# Patient Record
Sex: FEMALE | Race: ASIAN | NOT HISPANIC OR LATINO | Employment: OTHER | URBAN - METROPOLITAN AREA
[De-identification: names, ages, dates, MRNs, and addresses within clinical notes are randomized per-mention and may not be internally consistent; named-entity substitution may affect disease eponyms.]

---

## 2019-11-15 ENCOUNTER — OFFICE VISIT (OUTPATIENT)
Dept: FAMILY MEDICINE | Facility: CLINIC | Age: 34
End: 2019-11-15
Payer: COMMERCIAL

## 2019-11-15 VITALS — DIASTOLIC BLOOD PRESSURE: 70 MMHG | SYSTOLIC BLOOD PRESSURE: 112 MMHG

## 2019-11-15 DIAGNOSIS — L28.0 LICHEN SIMPLEX CHRONICUS: ICD-10-CM

## 2019-11-15 DIAGNOSIS — L29.9 LOCALIZED PRURITUS: ICD-10-CM

## 2019-11-15 DIAGNOSIS — L20.9 ATOPIC DERMATITIS, UNSPECIFIED TYPE: Primary | ICD-10-CM

## 2019-11-15 DIAGNOSIS — L85.3 XEROSIS OF SKIN: ICD-10-CM

## 2019-11-15 PROCEDURE — 99203 OFFICE O/P NEW LOW 30 MIN: CPT | Performed by: PHYSICIAN ASSISTANT

## 2019-11-15 RX ORDER — TRIAMCINOLONE ACETONIDE 0.25 MG/G
CREAM TOPICAL
Qty: 15 G | Refills: 0 | Status: SHIPPED | OUTPATIENT
Start: 2019-11-15 | End: 2021-08-06

## 2019-11-15 RX ORDER — FLUOCINONIDE 0.5 MG/G
CREAM TOPICAL 2 TIMES DAILY
Qty: 30 G | Refills: 1 | Status: SHIPPED | OUTPATIENT
Start: 2019-11-15 | End: 2021-08-06

## 2019-11-15 RX ORDER — TRIAMCINOLONE ACETONIDE 0.25 MG/G
CREAM TOPICAL
Qty: 30 G | Refills: 0 | Status: SHIPPED | OUTPATIENT
Start: 2019-11-15 | End: 2019-11-15

## 2019-11-15 RX ORDER — TRIAMCINOLONE ACETONIDE 1 MG/G
CREAM TOPICAL 2 TIMES DAILY
Qty: 30 G | Refills: 1 | Status: SHIPPED | OUTPATIENT
Start: 2019-11-15 | End: 2021-08-06

## 2019-11-15 NOTE — PROGRESS NOTES
HPI:  Maryam Tomlin is a 34 year old female patient here today for rash on eyelids, antecubital fossa, right axilla, and medial thighs .  Patient states this has been present for a while.  Patient reports the following symptoms: itch and rash .  Patient reports the following previous treatments: otc antifungal with no change.  Recently moved to [place] about 7-8 months ago. States skin is very dry. Patient reports the following modifying factors: none.  Associated symptoms: none.  Patient has no other skin complaints today.  Remainder of the HPI, Meds, PMH, Allergies, FH, and SH was reviewed in chart.      History reviewed. No pertinent past medical history.    History reviewed. No pertinent surgical history.     History reviewed. No pertinent family history.    Social History     Socioeconomic History     Marital status:      Spouse name: Not on file     Number of children: Not on file     Years of education: Not on file     Highest education level: Not on file   Occupational History     Not on file   Social Needs     Financial resource strain: Not on file     Food insecurity:     Worry: Not on file     Inability: Not on file     Transportation needs:     Medical: Not on file     Non-medical: Not on file   Tobacco Use     Smoking status: Never Smoker     Smokeless tobacco: Never Used   Substance and Sexual Activity     Alcohol use: Not on file     Drug use: Not on file     Sexual activity: Not on file   Lifestyle     Physical activity:     Days per week: Not on file     Minutes per session: Not on file     Stress: Not on file   Relationships     Social connections:     Talks on phone: Not on file     Gets together: Not on file     Attends Sabianist service: Not on file     Active member of club or organization: Not on file     Attends meetings of clubs or organizations: Not on file     Relationship status: Not on file     Intimate partner violence:     Fear of current or ex partner: Not on file      Emotionally abused: Not on file     Physically abused: Not on file     Forced sexual activity: Not on file   Other Topics Concern     Not on file   Social History Narrative     Not on file       No outpatient encounter medications on file as of 11/15/2019.     No facility-administered encounter medications on file as of 11/15/2019.        Review Of Systems:  Skin: As above  Eyes: negative  Ears/Nose/Throat: negative  Respiratory: No shortness of breath, dyspnea on exertion, cough, or hemoptysis  Cardiovascular: negative  Gastrointestinal: negative  Genitourinary: negative  Musculoskeletal: negative  Neurologic: negative  Psychiatric: negative  Hematologic/Lymphatic/Immunologic: negative  Endocrine: negative      Objective:     /70   Breastfeeding No   Eyes: Conjunctivae/lids: Normal   ENT: Lips:  Normal  MSK: Normal  Cardiovascular: Peripheral edema none  Pulm: Breathing Normal  Neuro/Psych: Orientation: Normal; Mood/Affect: Normal, NAD, WDWN  Pt accompanied by:   Following areas examined: face, neck, abdomen, medial and ventral thighs  Dunaway skin type:iv   Findings:  Generalized flaking of skin  Pink scaly thickened plaques on upper and lateral eyelids  Brown and pink scaly patches on antecubital fossa and right axilla  Assessment and Plan:  1) atopic dermatitis, LSC, localized pruritis, xerosis of skin  Apply a thin layer of  topical steroid triamcinolone 0.025% two times a day to eyelids for 2.5 weeks.   Apply a thin layer of topical steroid triamcinolone 0.01% two times a day to affected area on arms and right underarm for 2.5 weeks.   Apply a thin layer of topical steroid lidex 0.05% to affected on thighs two times a day for 2.5 weeks. Do not use on face or body folds.   Use a gentle cleanser in the shower.  Moisturized 2x/day with gentle moisturizing cream for one week. Then once a day going forward.   Keep skin cool. Avoid hot water use on body and thick occlusive clothing, both can  irritate skin      Side effects of topical steroids including but not limited to atrophy (skin thinning), striae (stretch marks) telangiectasias, steroid acne, and others. Do not apply to normal skin. Do not apply to discolored skin that does not have rash present. Educated patient on post inflammatory hyperpigmentation.       Proper skin care from [facility]:    -Eliminate harsh soaps as they strip the natural oils from the skin, often resulting in dry itchy skin ( i.e. Dial, Zest, Sallie Spring, Ivory)  -Use mild soaps or soap alternatitives such as Cetaphil or Dove Sensitive Skin in the shower. You do not need to use soap on arms, legs, and trunk every time you shower unless visibly soiled.   -Avoid hot or cold showers.  -After showering, lightly dry off and apply moisturizing within 2-3 minutes. This will help trap moisture in the skin. If you were prescribed a topical medication apply that first to rash and then apply body moisturize to entire body including rash.   -Aggressive use of a moisturizer at least 2 times a day to the entire body (including Vanicream, Cetaphil, Eucerin, CeraVe, Aquaphor or Vaseline ) and moisturize hands after every washing.  -We recommend using moisturizers that come in a tub that needs to be scooped out, not a pump. This has more of an oil base. It will hold moisture in your skin much better than a water base moisturizer. The above recommended are non-pore clogging.      Follow up in 2 weeks

## 2019-11-15 NOTE — LETTER
11/15/2019         RE: Maryam Tomlin  [address]  [place] [address]        Dear Colleague,    Thank you for referring your patient, Maryam Tomlin, to the [facility]. Please see a copy of my visit note below.    HPI:  Maryam Tomlin is a 34 year old female patient here today for rash on eyelids, antecubital fossa, right axilla, and medial thighs .  Patient states this has been present for a while.  Patient reports the following symptoms: itch and rash .  Patient reports the following previous treatments: otc antifungal with no change.  Recently moved to [place] about 7-8 months ago. States skin is very dry. Patient reports the following modifying factors: none.  Associated symptoms: none.  Patient has no other skin complaints today.  Remainder of the HPI, Meds, PMH, Allergies, FH, and SH was reviewed in chart.      History reviewed. No pertinent past medical history.    History reviewed. No pertinent surgical history.     History reviewed. No pertinent family history.    Social History     Socioeconomic History     Marital status:      Spouse name: Not on file     Number of children: Not on file     Years of education: Not on file     Highest education level: Not on file   Occupational History     Not on file   Social Needs     Financial resource strain: Not on file     Food insecurity:     Worry: Not on file     Inability: Not on file     Transportation needs:     Medical: Not on file     Non-medical: Not on file   Tobacco Use     Smoking status: Never Smoker     Smokeless tobacco: Never Used   Substance and Sexual Activity     Alcohol use: Not on file     Drug use: Not on file     Sexual activity: Not on file   Lifestyle     Physical activity:     Days per week: Not on file     Minutes per session: Not on file     Stress: Not on file   Relationships     Social connections:     Talks on phone: Not on file     Gets together: Not on file     Attends  Adventism service: Not on file     Active member of club or organization: Not on file     Attends meetings of clubs or organizations: Not on file     Relationship status: Not on file     Intimate partner violence:     Fear of current or ex partner: Not on file     Emotionally abused: Not on file     Physically abused: Not on file     Forced sexual activity: Not on file   Other Topics Concern     Not on file   Social History Narrative     Not on file       No outpatient encounter medications on file as of 11/15/2019.     No facility-administered encounter medications on file as of 11/15/2019.        Review Of Systems:  Skin: As above  Eyes: negative  Ears/Nose/Throat: negative  Respiratory: No shortness of breath, dyspnea on exertion, cough, or hemoptysis  Cardiovascular: negative  Gastrointestinal: negative  Genitourinary: negative  Musculoskeletal: negative  Neurologic: negative  Psychiatric: negative  Hematologic/Lymphatic/Immunologic: negative  Endocrine: negative      Objective:     /70   Breastfeeding No   Eyes: Conjunctivae/lids: Normal   ENT: Lips:  Normal  MSK: Normal  Cardiovascular: Peripheral edema none  Pulm: Breathing Normal  Neuro/Psych: Orientation: Normal; Mood/Affect: Normal, NAD, WDWN  Pt accompanied by:   Following areas examined: face, neck, abdomen, medial and ventral thighs  Dunaway skin type:iv   Findings:  Generalized flaking of skin  Pink scaly thickened plaques on upper and lateral eyelids  Brown and pink scaly patches on antecubital fossa and right axilla  Assessment and Plan:  1) atopic dermatitis, LSC, localized pruritis, xerosis of skin  Apply a thin layer of  topical steroid triamcinolone 0.025% two times a day to eyelids for 2.5 weeks.   Apply a thin layer of topical steroid triamcinolone 0.01% two times a day to affected area on arms and right underarm for 2.5 weeks.   Apply a thin layer of topical steroid lidex 0.05% to affected on thighs two times a day for 2.5  weeks. Do not use on face or body folds.   Use a gentle cleanser in the shower.  Moisturized 2x/day with gentle moisturizing cream for one week. Then once a day going forward.   Keep skin cool. Avoid hot water use on body and thick occlusive clothing, both can irritate skin      Side effects of topical steroids including but not limited to atrophy (skin thinning), striae (stretch marks) telangiectasias, steroid acne, and others. Do not apply to normal skin. Do not apply to discolored skin that does not have rash present. Educated patient on post inflammatory hyperpigmentation.       Proper skin care from [facility]:    -Eliminate harsh soaps as they strip the natural oils from the skin, often resulting in dry itchy skin ( i.e. Dial, Zest, [place] Spring, Ivory)  -Use mild soaps or soap alternatitives such as Cetaphil or Dove Sensitive Skin in the shower. You do not need to use soap on arms, legs, and trunk every time you shower unless visibly soiled.   -Avoid hot or cold showers.  -After showering, lightly dry off and apply moisturizing within 2-3 minutes. This will help trap moisture in the skin. If you were prescribed a topical medication apply that first to rash and then apply body moisturize to entire body including rash.   -Aggressive use of a moisturizer at least 2 times a day to the entire body (including Vanicream, Cetaphil, Eucerin, CeraVe, Aquaphor or Vaseline ) and moisturize hands after every washing.  -We recommend using moisturizers that come in a tub that needs to be scooped out, not a pump. This has more of an oil base. It will hold moisture in your skin much better than a water base moisturizer. The above recommended are non-pore clogging.      Follow up in 2 weeks          Again, thank you for allowing me to participate in the care of your patient.        Sincerely,        Shalonda Arceo PA-C

## 2019-11-15 NOTE — PATIENT INSTRUCTIONS
Proper skin care from [facility]:    -Eliminate harsh soaps as they strip the natural oils from the skin, often resulting in dry itchy skin ( i.e. Dial, Zest, Sallie Spring)  -Use mild soaps such as Cetaphil or Dove Sensitive Skin in the shower. You do not need to use soap on arms, legs, and trunk every time you shower unless visibly soiled.   -Avoid hot or cold showers.  -After showering, lightly dry off and apply moisturizing within 2-3 minutes. This will help trap moisture in the skin.   -Aggressive use of a moisturizer at least 1-2 times a day to the entire body (including -Vanicream, Cetaphil, Aquaphor or Cerave) and moisturize hands after every washing.  -We recommend using moisturizers that come in a tub that needs to be scooped out, not a pump. This has more of an oil base. It will hold moisture in your skin much better than a water base moisturizer. The above recommended are non-pore clogging.      Wear a sunscreen with at least SPF 30 on your face, ears, neck and V of the chest daily. Wear sunscreen on other areas of the body if those areas are exposed to the sun throughout the day. Sunscreens can contain physical and/or chemical blockers. Physical blockers are less likely to clog pores, these include zinc oxide and titanium dioxide. Reapply every two hour and after swimming. Sunscreen examples include Neutrogena, CeraVe, Blue Lizard, Elta MD and many others.    UV radiation  UVA radiation remains constant throughout the day and throughout the year. It is a longer wavelength than UVB and therefore penetrates deeper into the skin leading to immediate and delayed tanning, photoaging, and skin cancer. 70-80% of UVA and UVB radiation occurs between the hours of 10am-2pm.  UVB radiation  UVB radiation causes the most harmful effects and is more significant during the summer months. However, snow and ice can reflect UVB radiation leading to skin damage during the winter months as well. UVB radiation is  responsible for tanning, burning, inflammation, delayed erythema (pinkness), pigmentation (brown spots), and skin cancer.     Apply a thin layer of  topical steroid triamcinolone 0.025% two times a day to eyelids for 2.5 weeks.   Apply a thin layer of topical steroid triamcinolone 0.01% two times a day to affected area on arms and right underarm for 2.5 weeks.   Apply a thin layer of topical steroid lidex 0.05% to affected on thighs two times a day for 2.5 weeks. Do not use on face or body folds.   Use a gentle cleanser in the shower.  Moisturized 2x/day with gentle moisturizing cream for one week. Then once a day going forward.   Keep skin cool. Avoid hot water use on body and thick occlusive clothing, both can irritate skin      Side effects of topical steroids including but not limited to atrophy (skin thinning), striae (stretch marks) telangiectasias, steroid acne, and others. Do not apply to normal skin. Do not apply to discolored skin that does not have rash present. Educated patient on post inflammatory hyperpigmentation.       Proper skin care from [facility]:    -Eliminate harsh soaps as they strip the natural oils from the skin, often resulting in dry itchy skin ( i.e. Dial, Zest, Sallie Spring, Ivory)  -Use mild soaps or soap alternatitives such as Cetaphil or Dove Sensitive Skin in the shower. You do not need to use soap on arms, legs, and trunk every time you shower unless visibly soiled.   -Avoid hot or cold showers.  -After showering, lightly dry off and apply moisturizing within 2-3 minutes. This will help trap moisture in the skin. If you were prescribed a topical medication apply that first to rash and then apply body moisturize to entire body including rash.   -Aggressive use of a moisturizer at least 2 times a day to the entire body (including Vanicream, Cetaphil, Eucerin, CeraVe, Aquaphor or Vaseline ) and moisturize hands after every washing.  -We recommend using moisturizers that come  in a tub that needs to be scooped out, not a pump. This has more of an oil base. It will hold moisture in your skin much better than a water base moisturizer. The above recommended are non-pore clogging.      Follow up in 2 weeks

## 2019-12-12 ENCOUNTER — OFFICE VISIT (OUTPATIENT)
Dept: FAMILY MEDICINE | Facility: CLINIC | Age: 34
End: 2019-12-12
Payer: COMMERCIAL

## 2019-12-12 VITALS — DIASTOLIC BLOOD PRESSURE: 72 MMHG | SYSTOLIC BLOOD PRESSURE: 110 MMHG

## 2019-12-12 DIAGNOSIS — L20.9 ATOPIC DERMATITIS, UNSPECIFIED TYPE: Primary | ICD-10-CM

## 2019-12-12 DIAGNOSIS — L81.0 POST-INFLAMMATORY HYPERPIGMENTATION: ICD-10-CM

## 2019-12-12 DIAGNOSIS — L29.9 LOCALIZED PRURITUS: ICD-10-CM

## 2019-12-12 DIAGNOSIS — L81.1 MELASMA: ICD-10-CM

## 2019-12-12 DIAGNOSIS — L85.3 XEROSIS OF SKIN: ICD-10-CM

## 2019-12-12 PROCEDURE — 99213 OFFICE O/P EST LOW 20 MIN: CPT | Performed by: PHYSICIAN ASSISTANT

## 2019-12-12 RX ORDER — PIMECROLIMUS 10 MG/G
CREAM TOPICAL 2 TIMES DAILY
Qty: 30 G | Refills: 2 | Status: SHIPPED | OUTPATIENT
Start: 2019-12-12 | End: 2021-08-06

## 2019-12-12 NOTE — PATIENT INSTRUCTIONS
Proper skin care from [facility]:    -Eliminate harsh soaps as they strip the natural oils from the skin, often resulting in dry itchy skin ( i.e. Dial, Zest, Sallie Spring)  -Use mild soaps such as Cetaphil or Dove Sensitive Skin in the shower. You do not need to use soap on arms, legs, and trunk every time you shower unless visibly soiled.   -Avoid hot or cold showers.  -After showering, lightly dry off and apply moisturizing within 2-3 minutes. This will help trap moisture in the skin.   -Aggressive use of a moisturizer at least 1-2 times a day to the entire body (including -Vanicream, Cetaphil, Aquaphor or Cerave) and moisturize hands after every washing.  -We recommend using moisturizers that come in a tub that needs to be scooped out, not a pump. This has more of an oil base. It will hold moisture in your skin much better than a water base moisturizer. The above recommended are non-pore clogging.      Wear a sunscreen with at least SPF 30 on your face, ears, neck and V of the chest daily. Wear sunscreen on other areas of the body if those areas are exposed to the sun throughout the day. Sunscreens can contain physical and/or chemical blockers. Physical blockers are less likely to clog pores, these include zinc oxide and titanium dioxide. Reapply every two hour and after swimming. Sunscreen examples include Neutrogena, CeraVe, Blue Lizard, Elta MD and many others.    UV radiation  UVA radiation remains constant throughout the day and throughout the year. It is a longer wavelength than UVB and therefore penetrates deeper into the skin leading to immediate and delayed tanning, photoaging, and skin cancer. 70-80% of UVA and UVB radiation occurs between the hours of 10am-2pm.  UVB radiation  UVB radiation causes the most harmful effects and is more significant during the summer months. However, snow and ice can reflect UVB radiation leading to skin damage during the winter months as well. UVB radiation is  responsible for tanning, burning, inflammation, delayed erythema (pinkness), pigmentation (brown spots), and skin cancer.       For the dark spots on face  Okay to use hydroquinone 2% ( bleaching cream) 1-2x a day ( whatever the bottle recommends) for 3-4 months.     Okay to use elidel on trunk, arms, legs, and face.   Apply a thin layer of  topical steroid triamcinolone 0.025% two times a day to eyelids as needed no longer than 2-3 days at a time. If the elidel if covered please use that rather than the triamcinolone to eyelid area. Can use elidel 2x a day as needed to control the rash. Very important to continue to moisturize face and eyelids daily.   Rash has resolved on right underarm. If you flare up please Apply a thin layer of topical steroid triamcinolone 0.01% two times a day to affected area on arms and right underarm as needed no longer that 5-7 days at a time.   Rash has resolved on thighs. If you flare please Apply a thin layer of topical steroid lidex 0.05% to affected on thighs two times a day for 5-7 days. Stop sooner than the 5-7 days if rash improves before that. . Do not use on face or body folds.   Use a gentle cleanser in the shower.  Moisturized 2x/day with gentle moisturizing cream for one week. Then once a day going forward.   Keep skin cool. Avoid hot water use on body and thick occlusive clothing, both can irritate skin        Side effects of topical steroids including but not limited to atrophy (skin thinning), striae (stretch marks) telangiectasias, steroid acne, and others. Do not apply to normal skin. Do not apply to discolored skin that does not have rash present. Educated patient on post inflammatory hyperpigmentation.         Proper skin care from [facility]:     -Eliminate harsh soaps as they strip the natural oils from the skin, often resulting in dry itchy skin ( i.e. Dial, Zest, Sallie Spring, Ivory)  -Use mild soaps or soap alternatitives such as Cetaphil or Dove  Sensitive Skin in the shower. You do not need to use soap on arms, legs, and trunk every time you shower unless visibly soiled.   -Avoid hot or cold showers.  -After showering, lightly dry off and apply moisturizing within 2-3 minutes. This will help trap moisture in the skin. If you were prescribed a topical medication apply that first to rash and then apply body moisturize to entire body including rash.   -Aggressive use of a moisturizer at least 2 times a day to the entire body (including Vanicream, Cetaphil, Eucerin, CeraVe, Aquaphor or Vaseline ) and moisturize hands after every washing.  -We recommend using moisturizers that come in a tub that needs to be scooped out, not a pump. This has more of an oil base. It will hold moisture in your skin much better than a water base moisturizer. The above recommended are non-pore clogging.

## 2019-12-12 NOTE — LETTER
12/12/2019         RE: Maryam Tomlin  [address]  [place] [address]        Dear Colleague,    Thank you for referring your patient, Maryam Tomlin, to the [facility] [place]. Please see a copy of my visit note below.    Pt accompanied by  Davonte.     Rosendo buitrago CMA    HPI:  Maryam Tomlin is a 34 year old female patient here today for follow up dermatitis of eyelids, right axillary region and thighs .  Patient states this has been present for a while.  Patient reports the following symptoms: dry, itch and rash .  Patient reports the following previous treatments: otc moisturizer, TAC and lidex with resolution. Pt stopped tx and noticed left eyelid dermatitis flared up a bit. Has also noticed some brown spots on face for a while. No sx and no tx tried.  .  Patient reports the following modifying factors: none.  Associated symptoms: .  Patient has no other skin complaints today.  Remainder of the HPI, Meds, PMH, Allergies, FH, and SH was reviewed in chart.    Pertinent Hx:   No personal or family history of skin cancer    History reviewed. No pertinent past medical history.    History reviewed. No pertinent surgical history.     History reviewed. No pertinent family history.    Social History     Socioeconomic History     Marital status:      Spouse name: Not on file     Number of children: Not on file     Years of education: Not on file     Highest education level: Not on file   Occupational History     Not on file   Social Needs     Financial resource strain: Not on file     Food insecurity:     Worry: Not on file     Inability: Not on file     Transportation needs:     Medical: Not on file     Non-medical: Not on file   Tobacco Use     Smoking status: Never Smoker     Smokeless tobacco: Never Used   Substance and Sexual Activity     Alcohol use: Not on file     Drug use: Not on file     Sexual activity: Not on file   Lifestyle      Physical activity:     Days per week: Not on file     Minutes per session: Not on file     Stress: Not on file   Relationships     Social connections:     Talks on phone: Not on file     Gets together: Not on file     Attends Caodaism service: Not on file     Active member of club or organization: Not on file     Attends meetings of clubs or organizations: Not on file     Relationship status: Not on file     Intimate partner violence:     Fear of current or ex partner: Not on file     Emotionally abused: Not on file     Physically abused: Not on file     Forced sexual activity: Not on file   Other Topics Concern     Not on file   Social History Narrative     Not on file       Outpatient Encounter Medications as of 12/12/2019   Medication Sig Dispense Refill     fluocinonide (LIDEX) 0.05 % external cream Apply topically 2 times daily A thin layer to aa on thighs x 2 1/2 weeks. 30 g 1     triamcinolone (KENALOG) 0.025 % cream Apply a thin layer to affected area on eyelids BID x 2 weeks. 15 g 0     triamcinolone (KENALOG) 0.1 % external cream Apply topically 2 times daily A thin layer to aa on arms and right underarm x 2 1/2weeks 30 g 1     No facility-administered encounter medications on file as of 12/12/2019.        Review Of Systems:  Skin: As above  Eyes: negative  Ears/Nose/Throat: negative  Respiratory: No shortness of breath, dyspnea on exertion, cough, or hemoptysis  Cardiovascular: negative  Gastrointestinal: negative  Genitourinary: negative  Musculoskeletal: negative  Neurologic: negative  Psychiatric: negative  Hematologic/Lymphatic/Immunologic: negative  Endocrine: negative      Objective:     /72   Breastfeeding No   Eyes: Conjunctivae/lids: Normal   ENT: Lips:  Normal  MSK: Normal  Cardiovascular: Peripheral edema none  Pulm: Breathing Normal  Neuro/Psych: Orientation: Normal; Mood/Affect: Normal, NAD, WDWN  Pt accompanied by:   Following areas examined: face, neck, right axilla, medial  thighs  Dunaway skin type:iv   Findings:  Brown scaly patch on left upper eyelid  Brown smooth macules on cheeks and patch on chin  Assessment and Plan:  1)  melasma and PIH  Okay to use hydroquinone 2% ( bleaching cream) 1-2x a day for 3-4 months.  Wear a broad spectrum (covers UVA and UVB) sunscreen with at least SPF 30 on your face, ears, neck and V of the chest daily. Wear sunscreen on other areas of the body if those areas are exposed to the sun throughout the day. Sunscreens can contain physical and/or chemical blockers. Physical blockers are less likely to clog pores, these include zinc oxide and titanium dioxide. Reapply every two hour and after swimming. Sunscreen examples include Neutrogena, CeraVe, Blue Lizard, Elta MD and many others.         2) atopic dermatitis, localized pruritis, xerosis of skin  Great improvement.  Okay to use elidel on trunk, arms, legs, and face.   Apply a thin layer of  topical steroid triamcinolone 0.025% two times a day to eyelids as needed no longer than 2-3 days at a time. If the elidel if covered please use that rather than the triamcinolone to eyelid area. Can use elidel 2x a day as needed to control the rash. Very important to continue to moisturize face and eyelids daily.   Rash has resolved on right underarm. If you flare up please Apply a thin layer of topical steroid triamcinolone 0.01% two times a day to affected area on arms and right underarm as needed no longer that 5-7 days at a time.   Rash has resolved on thighs. If you flare please Apply a thin layer of topical steroid lidex 0.05% to affected on thighs two times a day for 5-7 days. Stop sooner than the 5-7 days if rash improves before that. . Do not use on face or body folds.   Use a gentle cleanser in the shower.  Moisturized 2x/day with gentle moisturizing cream for one week. Then once a day going forward.   Keep skin cool. Avoid hot water use on body and thick occlusive clothing, both can irritate  skin        Side effects of topical steroids including but not limited to atrophy (skin thinning), striae (stretch marks) telangiectasias, steroid acne, and others. Do not apply to normal skin. Do not apply to discolored skin that does not have rash present. Educated patient on post inflammatory hyperpigmentation.         Proper skin care from [facility]:     -Eliminate harsh soaps as they strip the natural oils from the skin, often resulting in dry itchy skin ( i.e. Dial, Zest, [place], Ivory)  -Use mild soaps or soap alternatitives such as Cetaphil or Dove Sensitive Skin in the shower. You do not need to use soap on arms, legs, and trunk every time you shower unless visibly soiled.   -Avoid hot or cold showers.  -After showering, lightly dry off and apply moisturizing within 2-3 minutes. This will help trap moisture in the skin. If you were prescribed a topical medication apply that first to rash and then apply body moisturize to entire body including rash.   -Aggressive use of a moisturizer at least 2 times a day to the entire body (including Vanicream, Cetaphil, Eucerin, CeraVe, Aquaphor or Vaseline ) and moisturize hands after every washing.  -We recommend using moisturizers that come in a tub that needs to be scooped out, not a pump. This has more of an oil base. It will hold moisture in your skin much better than a water base moisturizer. The above recommended are non-pore clogging.    Follow up in 3 months      Again, thank you for allowing me to participate in the care of your patient.        Sincerely,        Shalonda Arceo PA-C

## 2021-08-06 ENCOUNTER — OFFICE VISIT (OUTPATIENT)
Dept: FAMILY MEDICINE | Facility: CLINIC | Age: 36
End: 2021-08-06
Payer: COMMERCIAL

## 2021-08-06 VITALS
TEMPERATURE: 98.5 F | HEART RATE: 93 BPM | BODY MASS INDEX: 23.49 KG/M2 | HEIGHT: 61 IN | WEIGHT: 124.4 LBS | OXYGEN SATURATION: 98 % | SYSTOLIC BLOOD PRESSURE: 118 MMHG | DIASTOLIC BLOOD PRESSURE: 72 MMHG | RESPIRATION RATE: 16 BRPM

## 2021-08-06 DIAGNOSIS — Z00.00 ROUTINE GENERAL MEDICAL EXAMINATION AT A HEALTH CARE FACILITY: Primary | ICD-10-CM

## 2021-08-06 DIAGNOSIS — Z11.4 SCREENING FOR HIV (HUMAN IMMUNODEFICIENCY VIRUS): ICD-10-CM

## 2021-08-06 DIAGNOSIS — L65.9 HAIR LOSS: ICD-10-CM

## 2021-08-06 DIAGNOSIS — Z11.59 NEED FOR HEPATITIS C SCREENING TEST: ICD-10-CM

## 2021-08-06 DIAGNOSIS — D25.9 UTERINE LEIOMYOMA, UNSPECIFIED LOCATION: ICD-10-CM

## 2021-08-06 PROCEDURE — 99385 PREV VISIT NEW AGE 18-39: CPT | Performed by: INTERNAL MEDICINE

## 2021-08-06 ASSESSMENT — ENCOUNTER SYMPTOMS
DIZZINESS: 0
WEAKNESS: 0
CONSTIPATION: 0
FEVER: 0
FREQUENCY: 0
NERVOUS/ANXIOUS: 0
DYSURIA: 0
HEMATOCHEZIA: 0
ARTHRALGIAS: 0
HEADACHES: 0
HEARTBURN: 0
SHORTNESS OF BREATH: 0
CHILLS: 0
EYE PAIN: 0
COUGH: 0
SORE THROAT: 0
DIARRHEA: 0
ABDOMINAL PAIN: 0
PARESTHESIAS: 0
NAUSEA: 0
HEMATURIA: 0
PALPITATIONS: 0
JOINT SWELLING: 0
MYALGIAS: 0

## 2021-08-06 ASSESSMENT — MIFFLIN-ST. JEOR: SCORE: 1198.9

## 2021-08-06 ASSESSMENT — PAIN SCALES - GENERAL: PAINLEVEL: NO PAIN (0)

## 2021-08-06 NOTE — PROGRESS NOTES
SUBJECTIVE:   CC: Maryam Tomlin is an 35 year old woman who presents for preventive health visit.       Patient has been advised of split billing requirements and indicates understanding: Yes  Healthy Habits:     Getting at least 3 servings of Calcium per day:  Yes    Bi-annual eye exam:  Yes    Dental care twice a year:  NO    Sleep apnea or symptoms of sleep apnea:  None    Diet:  Regular (no restrictions) and Vegetarian/vegan    Frequency of exercise:  1 day/week    Duration of exercise:  Less than 15 minutes    Taking medications regularly:  Yes    Medication side effects:  None    PHQ-2 Total Score: 0    Additional concerns today:  No      Today's PHQ-2 Score:   PHQ-2 ( 1999 Pfizer) 8/6/2021   Q1: Little interest or pleasure in doing things 0   Q2: Feeling down, depressed or hopeless 0   PHQ-2 Score 0   Q1: Little interest or pleasure in doing things Not at all   Q2: Feeling down, depressed or hopeless Not at all   PHQ-2 Score 0       Abuse: Current or Past (Physical, Sexual or Emotional) - No  Do you feel safe in your environment? Yes    Have you ever done Advance Care Planning? (For example, a Health Directive, POLST, or a discussion with a medical provider or your loved ones about your wishes): No, advance care planning information given to patient to review.  Patient declined advance care planning discussion at this time.    Social History     Tobacco Use     Smoking status: Never Smoker     Smokeless tobacco: Never Used   Substance Use Topics     Alcohol use: Never     If you drink alcohol do you typically have >3 drinks per day or >7 drinks per week? Yes    Alcohol Use 8/6/2021   Prescreen: >3 drinks/day or >7 drinks/week? Not Applicable   No flowsheet data found.    Reviewed orders with patient.  Reviewed health maintenance and updated orders accordingly - Yes  Lab work is in process  Labs reviewed in EPIC    Breast Cancer Screening:    Breast CA Risk Assessment (FHS-7) 8/6/2021   Do you have a  family history of breast, colon, or ovarian cancer? No / Unknown       click delete button to remove this line now  Patient under 40 years of age: Routine Mammogram Screening not recommended.   Pertinent mammograms are reviewed under the imaging tab.    History of abnormal Pap smear: NO - age 30- 65 PAP every 3 years recommended     Reviewed and updated as needed this visit by clinical staff  Tobacco  Allergies  Meds              Reviewed and updated as needed this visit by Provider                No past medical history on file.   No past surgical history on file.  OB History   No obstetric history on file.       Review of Systems   Constitutional: Negative for chills and fever.   HENT: Negative for congestion, ear pain, hearing loss and sore throat.    Eyes: Negative for pain and visual disturbance.   Respiratory: Negative for cough and shortness of breath.    Cardiovascular: Negative for chest pain, palpitations and peripheral edema.   Gastrointestinal: Negative for abdominal pain, constipation, diarrhea, heartburn, hematochezia and nausea.   Genitourinary: Negative for dysuria, frequency, genital sores, hematuria, pelvic pain, urgency and vaginal bleeding.   Musculoskeletal: Negative for arthralgias, joint swelling and myalgias.   Skin: Negative for rash.   Neurological: Negative for dizziness, weakness, headaches and paresthesias.   Psychiatric/Behavioral: Negative for mood changes. The patient is not nervous/anxious.      CONSTITUTIONAL: NEGATIVE for fever, chills, change in weight  INTEGUMENTARU/SKIN: NEGATIVE for worrisome rashes, moles or lesions  EYES: NEGATIVE for vision changes or irritation  ENT: NEGATIVE for ear, mouth and throat problems  RESP: NEGATIVE for significant cough or SOB  BREAST: NEGATIVE for masses, tenderness or discharge  CV: NEGATIVE for chest pain, palpitations or peripheral edema  GI: NEGATIVE for nausea, abdominal pain, heartburn, or change in bowel habits  : NEGATIVE for  "unusual urinary or vaginal symptoms. Periods are regular.  MUSCULOSKELETAL: NEGATIVE for significant arthralgias or myalgia  NEURO: NEGATIVE for weakness, dizziness or paresthesias  PSYCHIATRIC: NEGATIVE for changes in mood or affect     OBJECTIVE:     PHYSICAL EXAM  VITAL SIGNS:   /72   Pulse 93   Temp 98.5  F (36.9  C) (Tympanic)   Resp 16   Ht 1.553 m (5' 1.14\")   Wt 56.4 kg (124 lb 6.4 oz)   LMP 07/25/2021   SpO2 98%   BMI 23.40 kg/m    Constitutional: Well developed, Well nourished, No acute distress, Non-toxic appearance.    HENT: Normocephalic, Atraumatic, Bilateral external ears normal, Oropharynx moist, No oral exudates, Nose normal.    Eyes: PERRLA, EOMI, Conjunctiva normal, No discharge.    Neck: Normal range of motion, No tenderness, Supple, No stridor.    Lymphatic: No lymphadenopathy noted.    Cardiovascular: Regular rate and rhythm,  No murmurs, No rubs, No gallops.    Thorax & Lungs: Normal breath sounds, No respiratory distress, No wheezing, No chest tenderness.    Abdomen: Bowel sounds normal, Soft, No tenderness, No masses, No pulsatile masses.    Skin: Warm, Dry, No erythema, No rash.    Back: No tenderness, No CVA tenderness.   Extremities: Intact distal pulses, No edema, No tenderness, No cyanosis, No clubbing.    Musculoskeletal: Good range of motion in all major joints. No tenderness to palpation or major deformities noted.    Neurologic: Alert & oriented x 3, Normal motor function, Normal sensory function, No focal deficits noted.    Psychiatric: Affect normal, Judgment normal, Mood normal.       ASSESSMENT/PLAN:     Assessment and Plan  1. Routine general medical examination at a health care facility  Pt is new to  , no records seen Does have medications for allergies in the list which she is not using anymore.  - REVIEW OF HEALTH MAINTENANCE PROTOCOL ORDERS  - HIV Antigen Antibody Combo; Future  - Hepatitis C Screen Reflex to HCV RNA Quant and Genotype; Future  - " Comprehensive metabolic panel (BMP + Alb, Alk Phos, ALT, AST, Total. Bili, TP); Future  - CBC with platelets; Future  - Lipid panel reflex to direct LDL Fasting; Future  - TSH with free T4 reflex; Future  - Vitamin D Deficiency; Future    2. Uterine leiomyoma, unspecified location  Diagnosed in [place] as per patient . No menorrhagia what so ever.     3. Screening for HIV (human immunodeficiency virus)  - HIV Antigen Antibody Combo; Future    4. Need for hepatitis C screening test  - Hepatitis C Screen Reflex to HCV RNA Quant and Genotype; Future    5. Hair loss  - TSH with free T4 reflex; Future  - Vitamin D Deficiency; Future       Patient Instructions   As discussed, will do the baseline labs and do further recommendations.   Please make a LAB ONLY appointment and PAP ONLY visit.     ================================    Patient Education   -  What Are Fibroids?  Fibroids are also called myomas and leiomyomas. They are growths that usually form in the wall of your uterus. Fibroids are the most common tumor in women. They are almost always noncancer (benign) and harmless. Fibroids are made up of connective tissue and muscle cells. They start as pea-sized lumps. But they can grow steadily during your reproductive years. Many fibroids just need to be watched. Others may need treatment if they become too large or cause symptoms.   -  Possible problems  Fibroids often cause no symptoms. But a fibroid that grows quickly in your uterus can cause one or more of the following problems:     Excessive uterine bleeding, leading to a lack of red blood cells (anemia)    Frequent urge to urinate    Trouble having bowel movements    Achiness, heaviness, or fullness    Back or belly (abdominal) pain    Pain during sex    Trouble getting pregnant or being unable to get pregnant    Problems with pregnancy    Enlargement of the lower abdomen  Treatment is tailored for you  No two fibroids are the same. The type of treatment you will  "have depends on several things, including:     How many fibroids you have, their size, location, and how fast they grow    How severe your symptoms are    If you plan to have children in the future  There are a growing number of effective ways to treat fibroids. After your medical evaluation, your healthcare provider will talk with you about the best options to solve your particular problem and meet your needs.   Your future checkups  Treating your fibroids is likely to relieve your symptoms. But your healthcare provider will want to check your progress. Ask your provider about any other follow-up visits you might need.   Verican last reviewed this educational content on 2020-2021 [employer]. All rights reserved. This information is not intended as a substitute for professional medical care. Always follow your healthcare professional's instructions.                     Return in about 4 weeks (around 9/3/2021), or if symptoms worsen or fail to improve, for PAP only visit.    Raquel Hernandez MD  [facility] [place]      Patient has been advised of split billing requirements and indicates understanding: Yes  COUNSELING:  Reviewed preventive health counseling, as reflected in patient instructions  Special attention given to:        Regular exercise       Healthy diet/nutrition       Vision screening       Hearing screening       Immunizations    Vaccinated for: TDAP             Family planning       Consider Hep C screening for all patients one time for ages 18-79 years       HIV screeninx in teen years, 1x in adult years, and at intervals if high risk    Estimated body mass index is 23.4 kg/m  as calculated from the following:    Height as of this encounter: 1.553 m (5' 1.14\").    Weight as of this encounter: 56.4 kg (124 lb 6.4 oz).        She reports that she has never smoked. She has never used smokeless tobacco.      Counseling Resources:  ATP IV " Guidelines  Pooled Cohorts Equation Calculator  Breast Cancer Risk Calculator  BRCA-Related Cancer Risk Assessment: FHS-7 Tool  FRAX Risk Assessment  ICSI Preventive Guidelines  Dietary Guidelines for Americans, 2010  USDA's MyPlate  ASA Prophylaxis  Lung CA Screening    Raquel Hernandez MD  [facility] [place]

## 2021-08-06 NOTE — PATIENT INSTRUCTIONS
As discussed, will do the baseline labs and do further recommendations.   Please make a LAB ONLY appointment and PAP ONLY visit.     ================================    Patient Education     What Are Fibroids?  Fibroids are also called myomas and leiomyomas. They are growths that usually form in the wall of your uterus. Fibroids are the most common tumor in women. They are almost always noncancer (benign) and harmless. Fibroids are made up of connective tissue and muscle cells. They start as pea-sized lumps. But they can grow steadily during your reproductive years. Many fibroids just need to be watched. Others may need treatment if they become too large or cause symptoms.     Possible problems  Fibroids often cause no symptoms. But a fibroid that grows quickly in your uterus can cause one or more of the following problems:     Excessive uterine bleeding, leading to a lack of red blood cells (anemia)    Frequent urge to urinate    Trouble having bowel movements    Achiness, heaviness, or fullness    Back or belly (abdominal) pain    Pain during sex    Trouble getting pregnant or being unable to get pregnant    Problems with pregnancy    Enlargement of the lower abdomen  Treatment is tailored for you  No two fibroids are the same. The type of treatment you will have depends on several things, including:     How many fibroids you have, their size, location, and how fast they grow    How severe your symptoms are    If you plan to have children in the future  There are a growing number of effective ways to treat fibroids. After your medical evaluation, your healthcare provider will talk with you about the best options to solve your particular problem and meet your needs.   Your future checkups  Treating your fibroids is likely to relieve your symptoms. But your healthcare provider will want to check your progress. Ask your provider about any other follow-up visits you might need.   Rosmery last reviewed this  educational content on 5/1/2020 2000-2021 [employer]. All rights reserved. This information is not intended as a substitute for professional medical care. Always follow your healthcare professional's instructions.

## 2021-08-11 ENCOUNTER — LAB (OUTPATIENT)
Dept: LAB | Facility: CLINIC | Age: 36
End: 2021-08-11
Payer: COMMERCIAL

## 2021-08-11 ENCOUNTER — MYC MEDICAL ADVICE (OUTPATIENT)
Dept: FAMILY MEDICINE | Facility: CLINIC | Age: 36
End: 2021-08-11

## 2021-08-11 DIAGNOSIS — R71.8 LOW MEAN CORPUSCULAR VOLUME (MCV): ICD-10-CM

## 2021-08-11 DIAGNOSIS — R71.8 LOW MEAN CORPUSCULAR VOLUME (MCV): Primary | ICD-10-CM

## 2021-08-11 DIAGNOSIS — Z11.4 SCREENING FOR HIV (HUMAN IMMUNODEFICIENCY VIRUS): ICD-10-CM

## 2021-08-11 DIAGNOSIS — Z11.59 NEED FOR HEPATITIS C SCREENING TEST: ICD-10-CM

## 2021-08-11 DIAGNOSIS — L65.9 HAIR LOSS: ICD-10-CM

## 2021-08-11 DIAGNOSIS — Z00.00 ROUTINE GENERAL MEDICAL EXAMINATION AT A HEALTH CARE FACILITY: ICD-10-CM

## 2021-08-11 LAB
ALBUMIN SERPL-MCNC: 4.1 G/DL (ref 3.4–5)
ALP SERPL-CCNC: 48 U/L (ref 40–150)
ALT SERPL W P-5'-P-CCNC: 24 U/L (ref 0–50)
ANION GAP SERPL CALCULATED.3IONS-SCNC: 5 MMOL/L (ref 3–14)
AST SERPL W P-5'-P-CCNC: 19 U/L (ref 0–45)
BILIRUB SERPL-MCNC: 0.4 MG/DL (ref 0.2–1.3)
BUN SERPL-MCNC: 7 MG/DL (ref 7–30)
CALCIUM SERPL-MCNC: 9.1 MG/DL (ref 8.5–10.1)
CHLORIDE BLD-SCNC: 109 MMOL/L (ref 94–109)
CHOLEST SERPL-MCNC: 154 MG/DL
CO2 SERPL-SCNC: 25 MMOL/L (ref 20–32)
CREAT SERPL-MCNC: 0.9 MG/DL (ref 0.52–1.04)
ERYTHROCYTE [DISTWIDTH] IN BLOOD BY AUTOMATED COUNT: 18 % (ref 10–15)
FASTING STATUS PATIENT QL REPORTED: YES
GFR SERPL CREATININE-BSD FRML MDRD: 83 ML/MIN/1.73M2
GLUCOSE BLD-MCNC: 84 MG/DL (ref 70–99)
HCT VFR BLD AUTO: 32 % (ref 35–47)
HDLC SERPL-MCNC: 52 MG/DL
HGB BLD-MCNC: 9.9 G/DL (ref 11.7–15.7)
LDLC SERPL CALC-MCNC: 81 MG/DL
MCH RBC QN AUTO: 22.4 PG (ref 26.5–33)
MCHC RBC AUTO-ENTMCNC: 30.9 G/DL (ref 31.5–36.5)
MCV RBC AUTO: 72 FL (ref 78–100)
NONHDLC SERPL-MCNC: 102 MG/DL
PLATELET # BLD AUTO: 199 10E3/UL (ref 150–450)
POTASSIUM BLD-SCNC: 4 MMOL/L (ref 3.4–5.3)
PROT SERPL-MCNC: 8.1 G/DL (ref 6.8–8.8)
RBC # BLD AUTO: 4.42 10E6/UL (ref 3.8–5.2)
SODIUM SERPL-SCNC: 139 MMOL/L (ref 133–144)
TRIGL SERPL-MCNC: 105 MG/DL
TSH SERPL DL<=0.005 MIU/L-ACNC: 3.93 MU/L (ref 0.4–4)
WBC # BLD AUTO: 4.9 10E3/UL (ref 4–11)

## 2021-08-11 PROCEDURE — 87389 HIV-1 AG W/HIV-1&-2 AB AG IA: CPT

## 2021-08-11 PROCEDURE — 85027 COMPLETE CBC AUTOMATED: CPT

## 2021-08-11 PROCEDURE — 82607 VITAMIN B-12: CPT

## 2021-08-11 PROCEDURE — 82306 VITAMIN D 25 HYDROXY: CPT

## 2021-08-11 PROCEDURE — 36415 COLL VENOUS BLD VENIPUNCTURE: CPT

## 2021-08-11 PROCEDURE — 80053 COMPREHEN METABOLIC PANEL: CPT

## 2021-08-11 PROCEDURE — 80061 LIPID PANEL: CPT

## 2021-08-11 PROCEDURE — 84443 ASSAY THYROID STIM HORMONE: CPT

## 2021-08-11 PROCEDURE — 83550 IRON BINDING TEST: CPT

## 2021-08-11 PROCEDURE — 86803 HEPATITIS C AB TEST: CPT

## 2021-08-12 ENCOUNTER — TELEPHONE (OUTPATIENT)
Dept: FAMILY MEDICINE | Facility: CLINIC | Age: 36
End: 2021-08-12

## 2021-08-12 LAB
DEPRECATED CALCIDIOL+CALCIFEROL SERPL-MC: 10 UG/L (ref 20–75)
HCV AB SERPL QL IA: NONREACTIVE
HIV 1+2 AB+HIV1 P24 AG SERPL QL IA: NONREACTIVE
IRON SATN MFR SERPL: 8 % (ref 15–46)
IRON SERPL-MCNC: 43 UG/DL (ref 35–180)
TIBC SERPL-MCNC: 517 UG/DL (ref 240–430)
VIT B12 SERPL-MCNC: 265 PG/ML (ref 193–986)

## 2021-08-12 NOTE — TELEPHONE ENCOUNTER
----- Message from Raquel Hernandez MD sent at 8/11/2021 11:55 PM CDT -----  Your cell size is low along with low hemoglobin for which I have added additional labs of Iron panel and Vitamin B12 to use blood in the lab . Will update the results report and do further recommendations.   Raquel Hernandez MD on 8/11/2021 at 11:55 PM

## 2021-08-14 DIAGNOSIS — E55.9 VITAMIN D DEFICIENCY: Primary | ICD-10-CM

## 2021-08-14 DIAGNOSIS — E61.1 IRON DEFICIENCY: ICD-10-CM

## 2021-08-14 RX ORDER — FERROUS SULFATE 325(65) MG
325 TABLET, DELAYED RELEASE (ENTERIC COATED) ORAL DAILY
Qty: 90 TABLET | Refills: 1 | Status: SHIPPED | OUTPATIENT
Start: 2021-08-14 | End: 2023-03-04

## 2021-08-14 RX ORDER — ERGOCALCIFEROL 1.25 MG/1
50000 CAPSULE, LIQUID FILLED ORAL WEEKLY
Qty: 12 CAPSULE | Refills: 0 | Status: SHIPPED | OUTPATIENT
Start: 2021-08-14 | End: 2024-04-11

## 2021-08-16 ENCOUNTER — TELEPHONE (OUTPATIENT)
Dept: FAMILY MEDICINE | Facility: CLINIC | Age: 36
End: 2021-08-16

## 2021-08-16 NOTE — TELEPHONE ENCOUNTER
----- Message from Raquel Hernandez MD sent at 8/14/2021  6:59 PM CDT -----  Your Vitamin D levels are abnormally low, Sent high dose Vitamin D 50,000 units once a week to your pharmacy for 3 months. After you complete the 3 months course resume back to Over the Counter Vitamin D 2000 units daily.    Your lab work is POSITIVE for Iron deficiency. Supplements sent to your pharmacy. Please take it with food to avoid gastritis. It will cause black stools, which you shouldn't be anxious about.  Also follow the diet below for improvement. Let me know if you have any questions.    Raquel Hernandez MD on 8/14/2021 at 6:58 PM    Dietary sources of iron  Food Approximate measure Iron (mg)  High-iron sources  Cereals, fortified 1 serving 4.5 to 17.8  Cream of Wheat (quick or instant)# 1/2 cup 7.8  Kidney, beef  2 oz (60 g) 5.3  Liver, beef  2 oz (60 g) 5.8  Liver, calf  2 oz (60 g) 9  Liver, chicken  2 oz (60 g) 6  Liverwurst  2 oz (60 g) 3.6  Nuts? 1 cup 5 to 7  Prune juice 1/2 cup 5.1  Seeds, sunflower? 3 1/2 oz (100 g) 7.1  Moderate-iron sources  Almonds, dried, unblanched 1/2 cup 3  Dried beans and peas  Baked beans, no pork 1/4 cup 1.5  Blackeye peas, cooked 1/4 cup 0.8  Chick peas, dry 1/4 cup 3.5  Great northern beans, cooked 1/4 cup 1.3  Green peas, cooked 1/4 cup 1.4  Lentils, dry 1/4 cup 3.4  Lima beans, cooked 1/4 cup 1.3  Navy beans, cooked 1/4 cup 1.3  Red beans, dry 1/4 cup 3.5  Soybeans, cooked 1/4 cup 1.4  White beans, dry 1/4 cup 3.9  Beef, cooked? 2 oz (60 g) 2 to 3  Ham, cooked 2 oz (60 g) 1.3  Lamb, cooked 2 oz (60 g) 1.9  Peaches, dried 1/4 cup 2.4  Peanuts, roasted without skins 3 1/2 oz (100 g) 3.2  Pork, cooked  2 oz (60 g) 2 to 3  Prunes, dried 2 large 1.1  Raisins? 5/8 cup 3.5  Scallops 2 oz (60 g) 1.6  Spinach (cooked) 1/2 cup 3.2  Spinach (raw) 1 cup 0.9  Turkey, cooked 2 oz (60 g) 1.7  Approximate iron content of popular prepared foods  Hamburger  Small 1 3  Large 1 5.2  Spaghetti with  meatballs 1 cup 3.3  Frankfurter and beans 1 cup 4.8  Pork and beans 1 cup 5.9  Chile con carne 1 cup 3.6  Beef burrito or brodie 1 medium 3.4 to 4.6  Cheese pizza 2 slices 3  Cheese pizza with beef 2 slices 4.8  White bread 1 piece 0.7  # Or other fortified cereals that contain 10 mg of iron per ounce or 100% recommended dietary allowance per serving.    Because organ meats are generally high in cholesterol, these iron-rich foods should be eaten in moderation.  ? Raisins, nuts, and seeds are not generally recommended for children under age 3, because of risk of choking.  ? Depending on cut, the greatest amounts of iron are generally found in the amelia, flank, and bottom round cuts of beef.    Depending on cut, the greatest amounts of iron are generally found in the loin, sirloin, tenderloin, and picnic shoulder cuts of pork.

## 2021-10-10 ENCOUNTER — HEALTH MAINTENANCE LETTER (OUTPATIENT)
Age: 36
End: 2021-10-10

## 2022-09-18 ENCOUNTER — HEALTH MAINTENANCE LETTER (OUTPATIENT)
Age: 37
End: 2022-09-18

## 2023-02-01 ENCOUNTER — OFFICE VISIT (OUTPATIENT)
Dept: FAMILY MEDICINE | Facility: CLINIC | Age: 38
End: 2023-02-01
Payer: COMMERCIAL

## 2023-02-01 DIAGNOSIS — L81.1 MELASMA: Primary | ICD-10-CM

## 2023-02-01 DIAGNOSIS — L30.9 HAND DERMATITIS: ICD-10-CM

## 2023-02-01 DIAGNOSIS — L30.9 DERMATITIS: ICD-10-CM

## 2023-02-01 PROCEDURE — 99204 OFFICE O/P NEW MOD 45 MIN: CPT | Performed by: NURSE PRACTITIONER

## 2023-02-01 RX ORDER — TRIAMCINOLONE ACETONIDE 1 MG/G
OINTMENT TOPICAL 2 TIMES DAILY
Qty: 30 G | Refills: 1 | Status: SHIPPED | OUTPATIENT
Start: 2023-02-01 | End: 2024-04-11

## 2023-02-01 RX ORDER — HYDROQUINONE 40 MG/G
CREAM TOPICAL
Qty: 30 G | Refills: 1 | Status: SHIPPED | OUTPATIENT
Start: 2023-02-01 | End: 2024-04-11

## 2023-02-01 RX ORDER — HYDROQUINONE 40 MG/G
CREAM TOPICAL
Qty: 30 G | Refills: 1 | Status: SHIPPED | OUTPATIENT
Start: 2023-02-01 | End: 2023-02-01

## 2023-02-01 RX ORDER — HYDROCORTISONE 25 MG/G
OINTMENT TOPICAL 2 TIMES DAILY
Qty: 20 G | Refills: 1 | Status: SHIPPED | OUTPATIENT
Start: 2023-02-01 | End: 2024-04-11

## 2023-02-01 ASSESSMENT — PAIN SCALES - GENERAL: PAINLEVEL: NO PAIN (0)

## 2023-02-01 NOTE — PROGRESS NOTES
[facility] Dermatology Note  Encounter Date: Feb 1, 2023  Office Visit     Reviewed patients past medical history and pertinent chart review prior to patients visit today.     Dermatology Problem List:  Melasma, cheeks and chin.  Given hydroquinone 4% cream to use twice daily  Hand dermatitis, given triamcinolone 0.1% ointment  Dermatitis behind the left ear, given hydrocortisone 2.5% ointment    ____________________________________________    Assessment & Plan:     # Hand dermatitis.   -Given betamethasone augmented formula  0.05% ointment to use BID until resolved and then BID PRN for flares.  Councled about use and side effects of thinning of the skin, striae, erythema, and worsening of rash.   Not for use in places other than hands or feet.   -apply steroid ointment at night to rashy areas, and petroleum jelly to the rest of the hands. Cover with cotton gloves overnight during sleep.   -reapply another time during the day when patient can keep on for 1+ hours without washing hands  -wear gloves when washing dishes, using cleaning supplies, or when hands would otherwise be immersed in soapy water.   -each time patient washes hands they should apply a moisturizing cream immediately afterwards. Examples include Cetaphil cream, Cera Ve Cream or Vanicream.       #melasma  Given prescription for hydroquinone 4% cream to use BID for 3 months. If she still has darkening that she would like to lighten I advised to take a 3 month break then restart for an additional 3 months. This medication will not be covered by insurance so I did give her a good Rx coupon to use.  Advised diligent use of high SPF sunscreen on a daily basis and reapply if outdoors for extended periods.     #Dermatitis left ear, given hydrocortisone 2.5% ointment to be used twice daily until fully resolved.  If not fully resolved in 1 month I would like her to follow-up in clinic. Councled about use and side effects of thinning of the  skin, striae, erythema, and worsening of rash.       Follow-up in 4 months for recheck of melasma, if well controlled and happy with the cosmetic results she will cancel the appointment.    Ashely Lima, MAUREEN CNP on 2/1/2023 at 4:11 PM   _______________________________________    CC: Skin Discoloration (Chronic. Was Rx'd meds from previous visit. Affecting face and back of left ear. Itchy ) and Eczema (On bilateral hands Sx x 4 weeks )    HPI:  Ms. Maryam Tomlin is a(n) 37 year old female who presents today as a new patient for several concerns.  She has some very dry rash on the palms of her hands.  She has been using moisturizer but she does not feel like she can get this to go away.  She also has some dark spots on both cheeks and her chin that she would like to lighten.  She has used some over-the-counter lightening creams but has not had improvement.  She also has a rash behind her left ear.  It is itchy and she scratches it.  She has not used anything for treatment of this.    Patient is otherwise feeling well, without additional skin concerns.      Physical Exam:  SKIN: Focused examination of face, ears, postauricular left scalp, hands was performed.  -General dryness and flaking of the palmar bilateral hands and palmar fingers, no fissures or subcutaneous pustules  -Postauricular scalp has lichenification, hyperpigmentation, and xerosis of skin  -Medial cheeks and chin have some reticular hyperpigmentation without scaling or inflammation    - No other lesions of concern on areas examined.     Medications:  Current Outpatient Medications   Medication     hydrocortisone 2.5 % ointment     hydroquinone (VIKI) 4 % external cream     triamcinolone (KENALOG) 0.1 % external ointment     ferrous sulfate (FE TABS) 325 (65 Fe) MG EC tablet     vitamin D2 (ERGOCALCIFEROL) 12915 units (1250 mcg) capsule     No current facility-administered medications for this visit.      Past Medical History:   Patient  Active Problem List   Diagnosis     Uterine leiomyoma, unspecified location     No past medical history on file.    CC Referred Self, MD  No address on file on close of this encounter.

## 2023-02-01 NOTE — PATIENT INSTRUCTIONS
Hand dermatitis.   -I have prescribed a steroid ointment called triamcinolone 0.1% ointment to use twice daily until resolved and then as needed for flares.    -at night, soak hands in water, apply steroid ointment to rashy areas, and a thick layer of petroleum jelly/Vaseline to the rest of the hands. Cover with cotton gloves overnight during sleep.   -reapply another time during the day when you can keep on for 1+ hours without washing hands  -wear gloves when washing dishes (currently cotton lined gloves or use your cotton gloves underneath your rubber gloves as a barrier), using cleaning supplies, or when hands would otherwise be immersed in soapy water.   -each time you wash your hands you should apply a moisturizing cream immediately afterwards. Examples include Cetaphil cream, Cera Ve Cream, Vanicream or vaseline  -Use a very mild soap such as dove bar soap or even better would be vanicream bar soap. Cut a bar into slices so you can have one in each area that your wash your hands so that she did not to come in contact with any harsh soaps that may be very drying to the hands or cause an allergy.  -The very mindful about anything this coming in contact with your hands that may cause an allergy.  Do not put any moisturizers or chemicals or products on your hands other than things listed above in case you are allergic to something that you are putting on your hands.  Try to avoid contact with rubbers and latex in case this is a problem as well.       For behind your ear use hydrocortisone 2.5% ointment twice daily until rash is resolved then stop.     For the darkness on the face use the hydroquinone twice daily for 3 months. Come back to see me in 4 months if not happy with results. Be diligent about sun protection as sun will darken these spots

## 2023-02-01 NOTE — LETTER
2/1/2023         RE: Maryam Tomlin  [address]  [place] [address]        Dear Colleague,    Thank you for referring your patient, Maryam Tomlin, to the [facility]. Please see a copy of my visit note below.    [facility] Dermatology Note  Encounter Date: Feb 1, 2023  Office Visit     Reviewed patients past medical history and pertinent chart review prior to patients visit today.     Dermatology Problem List:  Melasma, cheeks and chin.  Given hydroquinone 4% cream to use twice daily  Hand dermatitis, given triamcinolone 0.1% ointment  Dermatitis behind the left ear, given hydrocortisone 2.5% ointment    ____________________________________________    Assessment & Plan:     # Hand dermatitis.   -Given betamethasone augmented formula  0.05% ointment to use BID until resolved and then BID PRN for flares.  Councled about use and side effects of thinning of the skin, striae, erythema, and worsening of rash.   Not for use in places other than hands or feet.   -apply steroid ointment at night to rashy areas, and petroleum jelly to the rest of the hands. Cover with cotton gloves overnight during sleep.   -reapply another time during the day when patient can keep on for 1+ hours without washing hands  -wear gloves when washing dishes, using cleaning supplies, or when hands would otherwise be immersed in soapy water.   -each time patient washes hands they should apply a moisturizing cream immediately afterwards. Examples include Cetaphil cream, Cera Ve Cream or Vanicream.       #melasma  Given prescription for hydroquinone 4% cream to use BID for 3 months. If she still has darkening that she would like to lighten I advised to take a 3 month break then restart for an additional 3 months. This medication will not be covered by insurance so I did give her a good Rx coupon to use.  Advised diligent use of high SPF sunscreen on a daily basis and  reapply if outdoors for extended periods.     #Dermatitis left ear, given hydrocortisone 2.5% ointment to be used twice daily until fully resolved.  If not fully resolved in 1 month I would like her to follow-up in clinic. Councled about use and side effects of thinning of the skin, striae, erythema, and worsening of rash.       Follow-up in 4 months for recheck of melasma, if well controlled and happy with the cosmetic results she will cancel the appointment.    Ashely Lima, APRN CNP on 2/1/2023 at 4:11 PM   _______________________________________    CC: Skin Discoloration (Chronic. Was Rx'd meds from previous visit. Affecting face and back of left ear. Itchy ) and Eczema (On bilateral hands Sx x 4 weeks )    HPI:  Ms. Maryam Tomlin is a(n) 37 year old female who presents today as a new patient for several concerns.  She has some very dry rash on the palms of her hands.  She has been using moisturizer but she does not feel like she can get this to go away.  She also has some dark spots on both cheeks and her chin that she would like to lighten.  She has used some over-the-counter lightening creams but has not had improvement.  She also has a rash behind her left ear.  It is itchy and she scratches it.  She has not used anything for treatment of this.    Patient is otherwise feeling well, without additional skin concerns.      Physical Exam:  SKIN: Focused examination of face, ears, postauricular left scalp, hands was performed.  -General dryness and flaking of the palmar bilateral hands and palmar fingers, no fissures or subcutaneous pustules  -Postauricular scalp has lichenification, hyperpigmentation, and xerosis of skin  -Medial cheeks and chin have some reticular hyperpigmentation without scaling or inflammation    - No other lesions of concern on areas examined.     Medications:  Current Outpatient Medications   Medication     hydrocortisone 2.5 % ointment     hydroquinone (VIKI) 4 % external  cream     triamcinolone (KENALOG) 0.1 % external ointment     ferrous sulfate (FE TABS) 325 (65 Fe) MG EC tablet     vitamin D2 (ERGOCALCIFEROL) 22706 units (1250 mcg) capsule     No current facility-administered medications for this visit.      Past Medical History:   Patient Active Problem List   Diagnosis     Uterine leiomyoma, unspecified location     No past medical history on file.    CC Referred Self, MD  No address on file on close of this encounter.       Again, thank you for allowing me to participate in the care of your patient.        Sincerely,        MAUREEN Cannon CNP

## 2023-03-02 ENCOUNTER — OFFICE VISIT (OUTPATIENT)
Dept: FAMILY MEDICINE | Facility: CLINIC | Age: 38
End: 2023-03-02
Payer: COMMERCIAL

## 2023-03-02 VITALS
RESPIRATION RATE: 16 BRPM | DIASTOLIC BLOOD PRESSURE: 73 MMHG | WEIGHT: 113 LBS | BODY MASS INDEX: 21.34 KG/M2 | HEART RATE: 64 BPM | HEIGHT: 61 IN | OXYGEN SATURATION: 100 % | SYSTOLIC BLOOD PRESSURE: 115 MMHG

## 2023-03-02 DIAGNOSIS — E55.9 VITAMIN D DEFICIENCY: ICD-10-CM

## 2023-03-02 DIAGNOSIS — L65.9 HAIR LOSS: ICD-10-CM

## 2023-03-02 DIAGNOSIS — D25.9 UTERINE LEIOMYOMA, UNSPECIFIED LOCATION: ICD-10-CM

## 2023-03-02 DIAGNOSIS — Z12.4 CERVICAL CANCER SCREENING: ICD-10-CM

## 2023-03-02 DIAGNOSIS — D50.9 IRON DEFICIENCY ANEMIA, UNSPECIFIED IRON DEFICIENCY ANEMIA TYPE: ICD-10-CM

## 2023-03-02 DIAGNOSIS — Z00.00 ROUTINE GENERAL MEDICAL EXAMINATION AT A HEALTH CARE FACILITY: Primary | ICD-10-CM

## 2023-03-02 DIAGNOSIS — E61.1 IRON DEFICIENCY: ICD-10-CM

## 2023-03-02 DIAGNOSIS — Z13.220 ENCOUNTER FOR SCREENING FOR LIPID DISORDER: ICD-10-CM

## 2023-03-02 LAB
ALBUMIN SERPL BCG-MCNC: 4.4 G/DL (ref 3.5–5.2)
ALP SERPL-CCNC: 40 U/L (ref 35–104)
ALT SERPL W P-5'-P-CCNC: 19 U/L (ref 10–35)
ANION GAP SERPL CALCULATED.3IONS-SCNC: 11 MMOL/L (ref 7–15)
AST SERPL W P-5'-P-CCNC: 26 U/L (ref 10–35)
BILIRUB SERPL-MCNC: 0.3 MG/DL
BUN SERPL-MCNC: 10.2 MG/DL (ref 6–20)
CALCIUM SERPL-MCNC: 9.6 MG/DL (ref 8.6–10)
CHLORIDE SERPL-SCNC: 106 MMOL/L (ref 98–107)
CHOLEST SERPL-MCNC: 157 MG/DL
CREAT SERPL-MCNC: 0.8 MG/DL (ref 0.51–0.95)
DEPRECATED HCO3 PLAS-SCNC: 25 MMOL/L (ref 22–29)
ERYTHROCYTE [DISTWIDTH] IN BLOOD BY AUTOMATED COUNT: 17.3 % (ref 10–15)
GFR SERPL CREATININE-BSD FRML MDRD: >90 ML/MIN/1.73M2
GLUCOSE SERPL-MCNC: 85 MG/DL (ref 70–99)
HCT VFR BLD AUTO: 31.1 % (ref 35–47)
HDLC SERPL-MCNC: 54 MG/DL
HGB BLD-MCNC: 9.3 G/DL (ref 11.7–15.7)
IRON BINDING CAPACITY (ROCHE): 499 UG/DL (ref 240–430)
IRON SATN MFR SERPL: 7 % (ref 15–46)
IRON SERPL-MCNC: 35 UG/DL (ref 37–145)
LDLC SERPL CALC-MCNC: 86 MG/DL
MCH RBC QN AUTO: 21.9 PG (ref 26.5–33)
MCHC RBC AUTO-ENTMCNC: 29.9 G/DL (ref 31.5–36.5)
MCV RBC AUTO: 73 FL (ref 78–100)
NONHDLC SERPL-MCNC: 103 MG/DL
PLATELET # BLD AUTO: 218 10E3/UL (ref 150–450)
POTASSIUM SERPL-SCNC: 4.1 MMOL/L (ref 3.4–5.3)
PROT SERPL-MCNC: 7.5 G/DL (ref 6.4–8.3)
RBC # BLD AUTO: 4.25 10E6/UL (ref 3.8–5.2)
SODIUM SERPL-SCNC: 142 MMOL/L (ref 136–145)
TRIGL SERPL-MCNC: 84 MG/DL
TSH SERPL DL<=0.005 MIU/L-ACNC: 2.18 UIU/ML (ref 0.3–4.2)
VIT B12 SERPL-MCNC: 354 PG/ML (ref 232–1245)
WBC # BLD AUTO: 4.7 10E3/UL (ref 4–11)

## 2023-03-02 PROCEDURE — G0145 SCR C/V CYTO,THINLAYER,RESCR: HCPCS | Performed by: INTERNAL MEDICINE

## 2023-03-02 PROCEDURE — 90686 IIV4 VACC NO PRSV 0.5 ML IM: CPT | Performed by: INTERNAL MEDICINE

## 2023-03-02 PROCEDURE — 84443 ASSAY THYROID STIM HORMONE: CPT | Performed by: INTERNAL MEDICINE

## 2023-03-02 PROCEDURE — 83550 IRON BINDING TEST: CPT | Performed by: INTERNAL MEDICINE

## 2023-03-02 PROCEDURE — 90472 IMMUNIZATION ADMIN EACH ADD: CPT | Performed by: INTERNAL MEDICINE

## 2023-03-02 PROCEDURE — 80061 LIPID PANEL: CPT | Performed by: INTERNAL MEDICINE

## 2023-03-02 PROCEDURE — 82306 VITAMIN D 25 HYDROXY: CPT | Performed by: INTERNAL MEDICINE

## 2023-03-02 PROCEDURE — 90471 IMMUNIZATION ADMIN: CPT | Performed by: INTERNAL MEDICINE

## 2023-03-02 PROCEDURE — 87624 HPV HI-RISK TYP POOLED RSLT: CPT | Performed by: INTERNAL MEDICINE

## 2023-03-02 PROCEDURE — 90714 TD VACC NO PRESV 7 YRS+ IM: CPT | Performed by: INTERNAL MEDICINE

## 2023-03-02 PROCEDURE — 80053 COMPREHEN METABOLIC PANEL: CPT | Performed by: INTERNAL MEDICINE

## 2023-03-02 PROCEDURE — 99395 PREV VISIT EST AGE 18-39: CPT | Mod: 25 | Performed by: INTERNAL MEDICINE

## 2023-03-02 PROCEDURE — 85027 COMPLETE CBC AUTOMATED: CPT | Performed by: INTERNAL MEDICINE

## 2023-03-02 PROCEDURE — 82607 VITAMIN B-12: CPT | Performed by: INTERNAL MEDICINE

## 2023-03-02 PROCEDURE — 99213 OFFICE O/P EST LOW 20 MIN: CPT | Mod: 25 | Performed by: INTERNAL MEDICINE

## 2023-03-02 PROCEDURE — 36415 COLL VENOUS BLD VENIPUNCTURE: CPT | Performed by: INTERNAL MEDICINE

## 2023-03-02 PROCEDURE — 83540 ASSAY OF IRON: CPT | Performed by: INTERNAL MEDICINE

## 2023-03-02 ASSESSMENT — ENCOUNTER SYMPTOMS
JOINT SWELLING: 0
COUGH: 0
DIARRHEA: 0
HEMATOCHEZIA: 0
NERVOUS/ANXIOUS: 0
HEADACHES: 0
EYE PAIN: 0
NAUSEA: 0
FREQUENCY: 0
MYALGIAS: 0
CONSTIPATION: 0
HEMATURIA: 0
DYSURIA: 0
DIZZINESS: 0
HEARTBURN: 0
CHILLS: 0
FEVER: 0
WEAKNESS: 0
SHORTNESS OF BREATH: 0
PARESTHESIAS: 0
PALPITATIONS: 0
SORE THROAT: 0
ARTHRALGIAS: 0
ABDOMINAL PAIN: 0

## 2023-03-02 ASSESSMENT — PAIN SCALES - GENERAL: PAINLEVEL: NO PAIN (0)

## 2023-03-02 NOTE — PATIENT INSTRUCTIONS
As discussed , please do fasting labs ordered.     ======================    Preventive Health Recommendations  Female Ages 26 - 39  Yearly exam:   See your health care provider every year in order to  Review health changes.   Discuss preventive care.    Review your medicines if you your doctor has prescribed any.    Until age 30: Get a Pap test every three years (more often if you have had an abnormal result).    After age 30: Talk to your doctor about whether you should have a Pap test every 3 years or have a Pap test with HPV screening every 5 years.   You do not need a Pap test if your uterus was removed (hysterectomy) and you have not had cancer.  You should be tested each year for STDs (sexually transmitted diseases), if you're at risk.   Talk to your provider about how often to have your cholesterol checked.  If you are at risk for diabetes, you should have a diabetes test (fasting glucose).  Shots: Get a flu shot each year. Get a tetanus shot every 10 years.   Nutrition:   Eat at least 5 servings of fruits and vegetables each day.  Eat whole-grain bread, whole-wheat pasta and brown rice instead of white grains and rice.  Get adequate Calcium and Vitamin D.     Lifestyle  Exercise at least 150 minutes a week (30 minutes a day, 5 days of the week). This will help you control your weight and prevent disease.  Limit alcohol to one drink per day.  No smoking.   Wear sunscreen to prevent skin cancer.  See your dentist every six months for an exam and cleaning.

## 2023-03-02 NOTE — PROGRESS NOTES
SUBJECTIVE:   CC: Maryam is an 37 year old who presents for preventive health visit.     Patient has been advised of split billing requirements and indicates understanding: Yes  Healthy Habits:     Getting at least 3 servings of Calcium per day:  Yes    Bi-annual eye exam:  NO    Dental care twice a year:  NO    Sleep apnea or symptoms of sleep apnea:  None    Diet:  Regular (no restrictions) and Vegetarian/vegan    Frequency of exercise:  2-3 days/week    Duration of exercise:  15-30 minutes    Taking medications regularly:  Yes    Medication side effects:  None    PHQ-2 Total Score: 0    Additional concerns today:  No      Today's PHQ-2 Score:   PHQ-2 ( 1999 [employer]) 3/2/2023   Q1: Little interest or pleasure in doing things 0   Q2: Feeling down, depressed or hopeless 0   PHQ-2 Score 0   PHQ-2 Total Score (12-17 Years)- Positive if 3 or more points; Administer PHQ-A if positive -   Q1: Little interest or pleasure in doing things Not at all   Q2: Feeling down, depressed or hopeless Not at all   PHQ-2 Score 0           Social History     Tobacco Use     Smoking status: Never     Smokeless tobacco: Never   Substance Use Topics     Alcohol use: Never         Alcohol Use 3/2/2023   Prescreen: >3 drinks/day or >7 drinks/week? No   No flowsheet data found.    Reviewed orders with patient.  Reviewed health maintenance and updated orders accordingly - Yes  Lab work is in process  Labs reviewed in EPIC    Breast Cancer Screening:    Breast CA Risk Assessment (FHS-7) 8/6/2021   Do you have a family history of breast, colon, or ovarian cancer? No / Unknown       click delete button to remove this line now  Patient under 40 years of age: Routine Mammogram Screening not recommended.   Pertinent mammograms are reviewed under the imaging tab.    History of abnormal Pap smear: NO - age 30- 65 PAP every 3 years recommended     Reviewed and updated as needed this visit by clinical staff   Tobacco  Allergies  Meds  Problems   "Med Hx  Surg Hx  Fam Hx          Reviewed and updated as needed this visit by Provider   Tobacco  Allergies  Meds  Problems  Med Hx  Surg Hx  Fam Hx         History reviewed. No pertinent past medical history.   History reviewed. No pertinent surgical history.  OB History   No obstetric history on file.       Review of Systems   Constitutional: Negative for chills and fever.   HENT: Negative for congestion, ear pain, hearing loss and sore throat.    Eyes: Negative for pain and visual disturbance.   Respiratory: Negative for cough and shortness of breath.    Cardiovascular: Negative for chest pain, palpitations and peripheral edema.   Gastrointestinal: Negative for abdominal pain, constipation, diarrhea, heartburn, hematochezia and nausea.   Genitourinary: Negative for dysuria, frequency, genital sores, hematuria and urgency.   Musculoskeletal: Negative for arthralgias, joint swelling and myalgias.   Skin: Negative for rash.   Neurological: Negative for dizziness, weakness, headaches and paresthesias.   Psychiatric/Behavioral: Negative for mood changes. The patient is not nervous/anxious.      CONSTITUTIONAL: NEGATIVE for fever, chills, change in weight  INTEGUMENTARU/SKIN: NEGATIVE for worrisome rashes, moles or lesions  EYES: NEGATIVE for vision changes or irritation  ENT: NEGATIVE for ear, mouth and throat problems  RESP: NEGATIVE for significant cough or SOB  BREAST: NEGATIVE for masses, tenderness or discharge  CV: NEGATIVE for chest pain, palpitations or peripheral edema  GI: NEGATIVE for nausea, abdominal pain, heartburn, or change in bowel habits  : NEGATIVE for unusual urinary or vaginal symptoms. Periods are regular.  MUSCULOSKELETAL: NEGATIVE for significant arthralgias or myalgia  NEURO: NEGATIVE for weakness, dizziness or paresthesias  PSYCHIATRIC: NEGATIVE for changes in mood or affect     OBJECTIVE:   /73   Pulse 64   Resp 16   Ht 1.549 m (5' 1\")   Wt 51.3 kg (113 lb)   SpO2 100% "   BMI 21.35 kg/m    Physical Exam  GENERAL: healthy, alert and no distress  EYES: Eyes grossly normal to inspection, PERRL and conjunctivae and sclerae normal  HENT: ear canals and TM's normal, nose and mouth without ulcers or lesions  NECK: no adenopathy, no asymmetry, masses, or scars and thyroid normal to palpation  RESP: lungs clear to auscultation - no rales, rhonchi or wheezes  BREAST: normal without masses, tenderness or nipple discharge and no palpable axillary masses or adenopathy  CV: regular rate and rhythm, normal S1 S2, no S3 or S4, no murmur, click or rub, no peripheral edema and peripheral pulses strong  ABDOMEN: soft, nontender, no hepatosplenomegaly, no masses and bowel sounds normal  MS: no gross musculoskeletal defects noted, no edema  SKIN: no suspicious lesions or rashes  NEURO: Normal strength and tone, mentation intact and speech normal  PSYCH: mentation appears normal, affect normal/bright    Pelvic Exam:  Vulva: No external lesions, normal hair distribution, no adenopathy  Vagina: Moist, pink, no abnormal discharge, well rugated, no lesions  Cervix: Pap smear is taken, parous, smooth, pink, no visible lesions  Uterus: Normal size, anteverted, non-tender, mobile  Ovaries: No mass, non-tender, mobile    Diagnostic Test Results:  Labs reviewed in Epic    ASSESSMENT/PLAN:       Assessment and Plan  1. Routine general medical examination at a health care facility  Last seen pt on 8/2021 for annual physical at that time.   - CBC with platelets; Future  - Comprehensive metabolic panel (BMP + Alb, Alk Phos, ALT, AST, Total. Bili, TP); Future  - Lipid panel reflex to direct LDL Fasting; Future  - TSH with free T4 reflex; Future  - Iron and iron binding capacity; Future  - Vitamin B12; Future  - Vitamin D Deficiency; Future  - CBC with platelets  - Comprehensive metabolic panel (BMP + Alb, Alk Phos, ALT, AST, Total. Bili, TP)  - Lipid panel reflex to direct LDL Fasting  - TSH with free T4 reflex  -  Iron and iron binding capacity  - Vitamin B12  - Vitamin D Deficiency    2. Cervical cancer screening  - Pap Screen with HPV - recommended age 30 - 65 years    3. Iron deficiency anemia, unspecified iron deficiency anemia type  - CBC with platelets; Future  - Iron and iron binding capacity; Future  - Vitamin B12; Future  - CBC with platelets  - Iron and iron binding capacity  - Vitamin B12    4. Uterine leiomyoma, unspecified location  Stable, patient denies any concerns of menstrual cramps.    5. Vitamin D deficiency  - Vitamin D Deficiency; Future  - Vitamin D Deficiency    6. Hair loss  - TSH with free T4 reflex; Future  - TSH with free T4 reflex    7. Encounter for screening for lipid disorder  - Lipid panel reflex to direct LDL Fasting; Future  - Lipid panel reflex to direct LDL Fasting    8. Iron deficiency  Ongoing problem, Uncontrolled. Will send Iron supplements to pharmcy  - ferrous sulfate (FE TABS) 325 (65 Fe) MG EC tablet; Take 1 tablet (325 mg) by mouth daily  Dispense: 90 tablet; Refill: 1       Patient Instructions   As discussed , please do fasting labs ordered.     ======================    Preventive Health Recommendations  Female Ages 26 - 39  Yearly exam:   See your health care provider every year in order to    Review health changes.     Discuss preventive care.      Review your medicines if you your doctor has prescribed any.    Until age 30: Get a Pap test every three years (more often if you have had an abnormal result).    After age 30: Talk to your doctor about whether you should have a Pap test every 3 years or have a Pap test with HPV screening every 5 years.   You do not need a Pap test if your uterus was removed (hysterectomy) and you have not had cancer.  You should be tested each year for STDs (sexually transmitted diseases), if you're at risk.   Talk to your provider about how often to have your cholesterol checked.  If you are at risk for diabetes, you should have a diabetes test  (fasting glucose).  Shots: Get a flu shot each year. Get a tetanus shot every 10 years.   Nutrition:     Eat at least 5 servings of fruits and vegetables each day.    Eat whole-grain bread, whole-wheat pasta and brown rice instead of white grains and rice.    Get adequate Calcium and Vitamin D.     Lifestyle    Exercise at least 150 minutes a week (30 minutes a day, 5 days of the week). This will help you control your weight and prevent disease.    Limit alcohol to one drink per day.    No smoking.     Wear sunscreen to prevent skin cancer.    See your dentist every six months for an exam and cleaning.      Return in about 1 year (around 3/2/2024), or if symptoms worsen or fail to improve, for Preventative Visit.    Raquel Hernandez MD  [facility]      Patient has been advised of split billing requirements and indicates understanding: Yes      COUNSELING:  Reviewed preventive health counseling, as reflected in patient instructions  Special attention given to:        Regular exercise       Healthy diet/nutrition       Vision screening       Hearing screening       Immunizations    Vaccinated for: Influenza and Td             Osteoporosis prevention/bone health        She reports that she has never smoked. She has never used smokeless tobacco.      Raquel Hernandez MD  [facility]

## 2023-03-04 LAB — DEPRECATED CALCIDIOL+CALCIFEROL SERPL-MC: 47 UG/L (ref 20–75)

## 2023-03-04 RX ORDER — FERROUS SULFATE 325(65) MG
325 TABLET, DELAYED RELEASE (ENTERIC COATED) ORAL DAILY
Qty: 90 TABLET | Refills: 1 | Status: SHIPPED | OUTPATIENT
Start: 2023-03-04 | End: 2024-04-11

## 2023-03-06 LAB
BKR LAB AP GYN ADEQUACY: NORMAL
BKR LAB AP GYN INTERPRETATION: NORMAL
BKR LAB AP HPV REFLEX: NORMAL
BKR LAB AP PREVIOUS ABNORMAL: NORMAL
PATH REPORT.COMMENTS IMP SPEC: NORMAL
PATH REPORT.COMMENTS IMP SPEC: NORMAL
PATH REPORT.RELEVANT HX SPEC: NORMAL

## 2023-03-08 LAB
HUMAN PAPILLOMA VIRUS 16 DNA: NEGATIVE
HUMAN PAPILLOMA VIRUS 18 DNA: NEGATIVE
HUMAN PAPILLOMA VIRUS FINAL DIAGNOSIS: NORMAL
HUMAN PAPILLOMA VIRUS OTHER HR: NEGATIVE

## 2023-07-08 ENCOUNTER — MYC MEDICAL ADVICE (OUTPATIENT)
Dept: FAMILY MEDICINE | Facility: CLINIC | Age: 38
End: 2023-07-08
Payer: COMMERCIAL

## 2023-07-10 NOTE — TELEPHONE ENCOUNTER
Please see MyChart message and advise.  Unsure if pt is referring to birth control? Last visit was physical 3/2/23. Does provider want VV for this?    Alix WANG RN  [facility]

## 2024-03-05 ENCOUNTER — HOSPITAL ENCOUNTER (EMERGENCY)
Facility: CLINIC | Age: 39
Discharge: HOME | End: 2024-03-05
Attending: EMERGENCY MEDICINE | Admitting: EMERGENCY MEDICINE
Payer: COMMERCIAL

## 2024-03-05 VITALS
TEMPERATURE: 98.3 F | SYSTOLIC BLOOD PRESSURE: 137 MMHG | RESPIRATION RATE: 20 BRPM | HEART RATE: 72 BPM | OXYGEN SATURATION: 98 % | DIASTOLIC BLOOD PRESSURE: 71 MMHG

## 2024-03-05 DIAGNOSIS — R42 VERTIGO: ICD-10-CM

## 2024-03-05 DIAGNOSIS — R42 DIZZINESS: ICD-10-CM

## 2024-03-05 LAB
ALBUMIN SERPL BCG-MCNC: 4.3 G/DL (ref 3.5–5.2)
ALP SERPL-CCNC: 43 U/L (ref 40–150)
ALT SERPL W P-5'-P-CCNC: 13 U/L (ref 0–50)
ANION GAP SERPL CALCULATED.3IONS-SCNC: 13 MMOL/L (ref 7–15)
AST SERPL W P-5'-P-CCNC: 18 U/L (ref 0–45)
ATRIAL RATE - MUSE: 66 BPM
BASOPHILS # BLD AUTO: 0 10E3/UL (ref 0–0.2)
BASOPHILS NFR BLD AUTO: 0 %
BILIRUB SERPL-MCNC: 0.2 MG/DL
BUN SERPL-MCNC: 6.3 MG/DL (ref 6–20)
CALCIUM SERPL-MCNC: 9.2 MG/DL (ref 8.6–10)
CHLORIDE SERPL-SCNC: 104 MMOL/L (ref 98–107)
CREAT SERPL-MCNC: 0.88 MG/DL (ref 0.51–0.95)
DEPRECATED HCO3 PLAS-SCNC: 22 MMOL/L (ref 22–29)
DIASTOLIC BLOOD PRESSURE - MUSE: NORMAL MMHG
EGFRCR SERPLBLD CKD-EPI 2021: 86 ML/MIN/1.73M2
EOSINOPHIL # BLD AUTO: 0 10E3/UL (ref 0–0.7)
EOSINOPHIL NFR BLD AUTO: 1 %
ERYTHROCYTE [DISTWIDTH] IN BLOOD BY AUTOMATED COUNT: 16.4 % (ref 10–15)
GLUCOSE SERPL-MCNC: 109 MG/DL (ref 70–99)
HCG SERPL QL: NEGATIVE
HCT VFR BLD AUTO: 30.6 % (ref 35–47)
HGB BLD-MCNC: 9.3 G/DL (ref 11.7–15.7)
IMM GRANULOCYTES # BLD: 0 10E3/UL
IMM GRANULOCYTES NFR BLD: 0 %
INTERPRETATION ECG - MUSE: NORMAL
LYMPHOCYTES # BLD AUTO: 1.9 10E3/UL (ref 0.8–5.3)
LYMPHOCYTES NFR BLD AUTO: 35 %
MCH RBC QN AUTO: 22.4 PG (ref 26.5–33)
MCHC RBC AUTO-ENTMCNC: 30.4 G/DL (ref 31.5–36.5)
MCV RBC AUTO: 74 FL (ref 78–100)
MONOCYTES # BLD AUTO: 0.3 10E3/UL (ref 0–1.3)
MONOCYTES NFR BLD AUTO: 6 %
NEUTROPHILS # BLD AUTO: 3.1 10E3/UL (ref 1.6–8.3)
NEUTROPHILS NFR BLD AUTO: 58 %
NRBC # BLD AUTO: 0 10E3/UL
NRBC BLD AUTO-RTO: 0 /100
P AXIS - MUSE: 74 DEGREES
PLATELET # BLD AUTO: 207 10E3/UL (ref 150–450)
POTASSIUM SERPL-SCNC: 3.9 MMOL/L (ref 3.4–5.3)
PR INTERVAL - MUSE: 134 MS
PROT SERPL-MCNC: 7.3 G/DL (ref 6.4–8.3)
QRS DURATION - MUSE: 76 MS
QT - MUSE: 402 MS
QTC - MUSE: 421 MS
R AXIS - MUSE: 29 DEGREES
RBC # BLD AUTO: 4.16 10E6/UL (ref 3.8–5.2)
SODIUM SERPL-SCNC: 139 MMOL/L (ref 135–145)
SYSTOLIC BLOOD PRESSURE - MUSE: NORMAL MMHG
T AXIS - MUSE: 22 DEGREES
VENTRICULAR RATE- MUSE: 66 BPM
WBC # BLD AUTO: 5.3 10E3/UL (ref 4–11)

## 2024-03-05 PROCEDURE — 85025 COMPLETE CBC W/AUTO DIFF WBC: CPT | Performed by: EMERGENCY MEDICINE

## 2024-03-05 PROCEDURE — 84703 CHORIONIC GONADOTROPIN ASSAY: CPT | Performed by: EMERGENCY MEDICINE

## 2024-03-05 PROCEDURE — 80053 COMPREHEN METABOLIC PANEL: CPT | Performed by: EMERGENCY MEDICINE

## 2024-03-05 PROCEDURE — 99284 EMERGENCY DEPT VISIT MOD MDM: CPT

## 2024-03-05 PROCEDURE — 36415 COLL VENOUS BLD VENIPUNCTURE: CPT | Performed by: EMERGENCY MEDICINE

## 2024-03-05 PROCEDURE — 93005 ELECTROCARDIOGRAM TRACING: CPT

## 2024-03-05 PROCEDURE — 250N000013 HC RX MED GY IP 250 OP 250 PS 637: Performed by: EMERGENCY MEDICINE

## 2024-03-05 RX ORDER — MECLIZINE HYDROCHLORIDE 25 MG/1
25 TABLET ORAL ONCE
Status: COMPLETED | OUTPATIENT
Start: 2024-03-05 | End: 2024-03-05

## 2024-03-05 RX ORDER — MECLIZINE HCL 12.5 MG 12.5 MG/1
12.5 TABLET ORAL 4 TIMES DAILY PRN
Qty: 30 TABLET | Refills: 0 | Status: SHIPPED | OUTPATIENT
Start: 2024-03-05 | End: 2024-04-11

## 2024-03-05 RX ADMIN — MECLIZINE HYDROCHLORIDE 25 MG: 25 TABLET ORAL at 03:54

## 2024-03-05 ASSESSMENT — COLUMBIA-SUICIDE SEVERITY RATING SCALE - C-SSRS
1. IN THE PAST MONTH, HAVE YOU WISHED YOU WERE DEAD OR WISHED YOU COULD GO TO SLEEP AND NOT WAKE UP?: NO
6. HAVE YOU EVER DONE ANYTHING, STARTED TO DO ANYTHING, OR PREPARED TO DO ANYTHING TO END YOUR LIFE?: NO
2. HAVE YOU ACTUALLY HAD ANY THOUGHTS OF KILLING YOURSELF IN THE PAST MONTH?: NO

## 2024-03-05 ASSESSMENT — ACTIVITIES OF DAILY LIVING (ADL)
ADLS_ACUITY_SCORE: 33
ADLS_ACUITY_SCORE: 35
ADLS_ACUITY_SCORE: 35

## 2024-03-05 NOTE — ED PROVIDER NOTES
History     Chief Complaint:  Dizziness       HPI   Maryam Tomlin is a 38 year old female [facility] emergency department with her  for a dizzy episode.  Patient states that around 1130 she was cleaning up from dinner and began having a feeling of spinning in her head.  There was no near syncope or chest pain or palpitations.  She went to lay down in bed and symptoms seem to get worse.  She had vomiting x 1 and ongoing nausea.  She denies headache or neck pain.  No recent head injuries.  No history of vertigo or similar.  Denies tinnitus.  Denies URI symptoms.  No diarrhea or abdominal pain.  No chest pain.  Symptoms lasted about an hour and a half and she feels they are gone now.  Patient denies headache.  No fevers or chills.  Patient's last menstrual period was February 12.      Independent Historian:   Spouse/Partner - They report history noted above    Review of External Notes:   Office visit from 3-23      Medications:    ferrous sulfate (FE TABS) 325 (65 Fe) MG EC tablet  hydrocortisone 2.5 % ointment  hydroquinone (VIKI) 4 % external cream  triamcinolone (KENALOG) 0.1 % external ointment  vitamin D2 (ERGOCALCIFEROL) 17109 units (1250 mcg) capsule        Past Medical History:    No past medical history on file.    Past Surgical History:    No past surgical history on file.     Physical Exam   Patient Vitals for the past 24 hrs:   BP Temp Temp src Pulse Resp SpO2   03/05/24 0136 137/71 98.3  F (36.8  C) Oral 72 20 98 %        Physical Exam  General: Patient is alert and normal appearing.  HEENT: Head atraumatic    Eyes: pupils equal and reactive. Conjunctiva clear   Nares: patent   Oropharynx: no lesions, uvula midline, no palatal draping, normal voice, no trismus  Neck: Supple without lymphadenopathy, no meningismus  Chest: Heart regular rate and rhythm.   Lungs: Equal clear to auscultation with no wheeze or rales  Abdomen: Soft, non tender, nondistended, normal bowel sounds  Back: No  costovertebral angle tenderness, no midline C, T or L spine tenderness  Neuro: Grossly nonfocal, normal speech, strength equal bilaterally, CN 2-12 intact, normal finger-nose, no pronator drift, no nystagmus, normal gait, normal heel walking and normal toe walking  Extremities: No deformities, equal radial and DP pulses. No clubbing, cyanosis.  No edema  Skin: Warm and dry with no rash.       Emergency Department Course   ECG  ECG results from 03/05/24   EKG 12-lead, tracing only     Value    Systolic Blood Pressure     Diastolic Blood Pressure     Ventricular Rate 66    Atrial Rate 66    CO Interval 134    QRS Duration 76        QTc 421    P Axis 74    R AXIS 29    T Axis 22    Interpretation ECG      Sinus rhythm  Normal ECG  No previous ECGs available  Confirmed by GENERATED REPORT, COMPUTER ([id number]),  Peter Hodges ([id number]) on 3/5/2024 3:49:00 AM           Imaging:  No orders to display          Laboratory:  Labs Ordered and Resulted from Time of ED Arrival to Time of ED Departure   COMPREHENSIVE METABOLIC PANEL - Abnormal       Result Value    Sodium 139      Potassium 3.9      Carbon Dioxide (CO2) 22      Anion Gap 13      Urea Nitrogen 6.3      Creatinine 0.88      GFR Estimate 86      Calcium 9.2      Chloride 104      Glucose 109 (*)     Alkaline Phosphatase 43      AST 18      ALT 13      Protein Total 7.3      Albumin 4.3      Bilirubin Total 0.2     CBC WITH PLATELETS AND DIFFERENTIAL - Abnormal    WBC Count 5.3      RBC Count 4.16      Hemoglobin 9.3 (*)     Hematocrit 30.6 (*)     MCV 74 (*)     MCH 22.4 (*)     MCHC 30.4 (*)     RDW 16.4 (*)     Platelet Count 207      % Neutrophils 58      % Lymphocytes 35      % Monocytes 6      % Eosinophils 1      % Basophils 0      % Immature Granulocytes 0      NRBCs per 100 WBC 0      Absolute Neutrophils 3.1      Absolute Lymphocytes 1.9      Absolute Monocytes 0.3      Absolute Eosinophils 0.0      Absolute Basophils 0.0      Absolute Immature  Granulocytes 0.0      Absolute NRBCs 0.0     HCG QUALITATIVE PREGNANCY - Normal    hCG Serum Qualitative Negative     ROUTINE UA WITH MICROSCOPIC REFLEX TO CULTURE        Procedures   None    Emergency Department Course & Assessments:    Interventions:  Medications   meclizine (ANTIVERT) tablet 25 mg (has no administration in time range)   sodium chloride 0.9% BOLUS 1,000 mL (has no administration in time range)        Assessments:  0330 patient seen and examined by me  0545 I reassessed the patient who felt improved after meclizine and has a normal neurologic exam    Independent Interpretation (X-rays, CTs, rhythm strip):  None    Consultations/Discussion of Management or Tests:  None        Social Determinants of Health affecting care:   None    Disposition:  The patient was discharged.     Impression & Plan    CMS Diagnoses: None      Medical Decision Making:  Maryam Tomlin is a 38 year old female who presents for evaluation of vertigo. The differential diagnosis of vertigo is broad and includes common etiologies such as menieres disease, labyrinthitis, benign positional vertigo, otitis media, etc.  More serious etiologies considered include central etiologies such as tumor, intracerebral bleed, dissection, ischemic cerebral vascular accident.  The history, physical exam including detailed neurologic exam, and workup in the emergency room suggests a benign cause of vertigo today.  Completely normal neurologic exam.  No headache.  No neck pain or stiffness.  No fever.  Do not suspect meningitis or ensure cranial hemorrhage.  Doubt CVA based on normal neurologic exam.  Patient feels improved after interventions noted above. Further outpatient management is indicated with vertigo medications.       No indication for advanced imaging at this point (CT/MRI) as there are no definite signs of a central and concerning etiology for the vertigo.  Return precautions reviewed.  Follow-up at [facility] with  primary care was discussed as well.    Vertigo precautions given for home.          Diagnosis:    ICD-10-CM    1. Dizziness  R42       2. Vertigo  R42            Discharge Medications:  New Prescriptions    MECLIZINE (ANTIVERT) 12.5 MG TABLET    Take 1 tablet (12.5 mg) by mouth 4 times daily as needed for dizziness          3/5/2024   Cyndy Henry MD Neuner, Maria Bea, MD  03/05/24 0546

## 2024-03-05 NOTE — ED TRIAGE NOTES
Patient reports dizziness for 2 hours and vomiting x2.  Reports the dizziness came first. Denies fevers but reports some chills. She reports she is feeling better now and not currently dizzy. She declines any blood work or fluids at this time.

## 2024-04-11 ENCOUNTER — OFFICE VISIT (OUTPATIENT)
Dept: FAMILY MEDICINE | Facility: CLINIC | Age: 39
End: 2024-04-11
Payer: COMMERCIAL

## 2024-04-11 VITALS
TEMPERATURE: 98.2 F | HEIGHT: 61 IN | RESPIRATION RATE: 14 BRPM | SYSTOLIC BLOOD PRESSURE: 123 MMHG | WEIGHT: 118.2 LBS | HEART RATE: 69 BPM | DIASTOLIC BLOOD PRESSURE: 82 MMHG | BODY MASS INDEX: 22.31 KG/M2 | OXYGEN SATURATION: 100 %

## 2024-04-11 DIAGNOSIS — D50.9 IRON DEFICIENCY ANEMIA, UNSPECIFIED IRON DEFICIENCY ANEMIA TYPE: ICD-10-CM

## 2024-04-11 DIAGNOSIS — R73.01 IFG (IMPAIRED FASTING GLUCOSE): ICD-10-CM

## 2024-04-11 DIAGNOSIS — Z13.220 ENCOUNTER FOR SCREENING FOR LIPID DISORDER: ICD-10-CM

## 2024-04-11 DIAGNOSIS — D25.9 UTERINE LEIOMYOMA, UNSPECIFIED LOCATION: ICD-10-CM

## 2024-04-11 DIAGNOSIS — Z00.00 ROUTINE GENERAL MEDICAL EXAMINATION AT A HEALTH CARE FACILITY: Primary | ICD-10-CM

## 2024-04-11 DIAGNOSIS — E61.1 IRON DEFICIENCY: ICD-10-CM

## 2024-04-11 DIAGNOSIS — L30.9 DERMATITIS: ICD-10-CM

## 2024-04-11 DIAGNOSIS — E55.9 VITAMIN D DEFICIENCY: ICD-10-CM

## 2024-04-11 LAB
ERYTHROCYTE [DISTWIDTH] IN BLOOD BY AUTOMATED COUNT: 16.7 % (ref 10–15)
HBA1C MFR BLD: 5.5 % (ref 0–5.6)
HCT VFR BLD AUTO: 30.3 % (ref 35–47)
HGB BLD-MCNC: 8.8 G/DL (ref 11.7–15.7)
MCH RBC QN AUTO: 21.2 PG (ref 26.5–33)
MCHC RBC AUTO-ENTMCNC: 29 G/DL (ref 31.5–36.5)
MCV RBC AUTO: 73 FL (ref 78–100)
PLATELET # BLD AUTO: 208 10E3/UL (ref 150–450)
RBC # BLD AUTO: 4.15 10E6/UL (ref 3.8–5.2)
WBC # BLD AUTO: 5 10E3/UL (ref 4–11)

## 2024-04-11 PROCEDURE — 82306 VITAMIN D 25 HYDROXY: CPT | Performed by: INTERNAL MEDICINE

## 2024-04-11 PROCEDURE — 83036 HEMOGLOBIN GLYCOSYLATED A1C: CPT | Performed by: INTERNAL MEDICINE

## 2024-04-11 PROCEDURE — 85027 COMPLETE CBC AUTOMATED: CPT | Performed by: INTERNAL MEDICINE

## 2024-04-11 PROCEDURE — 36415 COLL VENOUS BLD VENIPUNCTURE: CPT | Performed by: INTERNAL MEDICINE

## 2024-04-11 PROCEDURE — 99395 PREV VISIT EST AGE 18-39: CPT | Performed by: INTERNAL MEDICINE

## 2024-04-11 PROCEDURE — 83550 IRON BINDING TEST: CPT | Performed by: INTERNAL MEDICINE

## 2024-04-11 PROCEDURE — 80061 LIPID PANEL: CPT | Performed by: INTERNAL MEDICINE

## 2024-04-11 PROCEDURE — 80053 COMPREHEN METABOLIC PANEL: CPT | Performed by: INTERNAL MEDICINE

## 2024-04-11 PROCEDURE — 83540 ASSAY OF IRON: CPT | Performed by: INTERNAL MEDICINE

## 2024-04-11 PROCEDURE — 99214 OFFICE O/P EST MOD 30 MIN: CPT | Mod: 25 | Performed by: INTERNAL MEDICINE

## 2024-04-11 PROCEDURE — 84443 ASSAY THYROID STIM HORMONE: CPT | Performed by: INTERNAL MEDICINE

## 2024-04-11 RX ORDER — CLOBETASOL PROPIONATE 0.5 MG/G
CREAM TOPICAL 2 TIMES DAILY
Qty: 60 G | Refills: 1 | Status: SHIPPED | OUTPATIENT
Start: 2024-04-11

## 2024-04-11 RX ORDER — ERGOCALCIFEROL (VITAMIN D2) 10 MCG
TABLET ORAL
COMMUNITY

## 2024-04-11 SDOH — HEALTH STABILITY: PHYSICAL HEALTH: ON AVERAGE, HOW MANY DAYS PER WEEK DO YOU ENGAGE IN MODERATE TO STRENUOUS EXERCISE (LIKE A BRISK WALK)?: 2 DAYS

## 2024-04-11 SDOH — HEALTH STABILITY: PHYSICAL HEALTH: ON AVERAGE, HOW MANY MINUTES DO YOU ENGAGE IN EXERCISE AT THIS LEVEL?: 30 MIN

## 2024-04-11 ASSESSMENT — SOCIAL DETERMINANTS OF HEALTH (SDOH): HOW OFTEN DO YOU GET TOGETHER WITH FRIENDS OR RELATIVES?: ONCE A WEEK

## 2024-04-11 ASSESSMENT — PAIN SCALES - GENERAL: PAINLEVEL: NO PAIN (0)

## 2024-04-11 NOTE — COMMUNITY RESOURCES LIST (PATIENT PREFERRED LANGUAGE)
April 11, 2024           ?? ????????????????? ????? ????? ???????????? ??????           ??????? ????? ???????    ?????????? ???????      [facility] (ASC) - Adult Shelter Connect (ASC)  [address] [place] [address] (????: 11.3 ??????)  ????: [phone]  ????????: [email or web address]://[email or web address]/ourNudipay Mobile Paymentprograms/adult-shelter-connect-hebert-[email or web address]/  ???: ????????, ????????  ??????: ????      - Domestic violence shelter  [address] [place] [address] (????: 9.7 ??????)  ????: [phone]  ???: ?????  ??????: ????  ???????????: ??????? ????, ?????? ???? ???????? ????, ??? ????????, ?????? ?????      [facility] - Housing Stabilization Services  ????: [phone]  ????????: [email or web address]-[email or web address]  ???: ?????  ?????: ??? 9:00 AM - 5:00 PM ??? 9:00 AM - 5:00 PM ??? 9:00 AM - 5:00 PM ??? 9:00 AM - 5:00 PM ??? 9:00 AM - 5:00 PM  ??????: ????  ???????????: ?????????? ???? ???????? ????, ?????? ?????    ??????? ???? ???????      Living - Housing Support-[facility] (HWS-I)  [address] [place] [address] (????: 10.9 ??????)  ????: [phone]  ????????: [email or web address]://www.Think Realtime  ???: ??????, ????????, ??????  ??????: ????      [facility] - Housing With Services Independent  [address] [place] [address] (????: 13.5 ??????)  ????: [phone]  ????????: [email or web address]://[email or web address]/contact  ???: ????????, ????????  ???????????: ??? ????????, ??????? ????, ?????? ???? ???????? ?????, ?????? ?????      [facility] - Housing Stabilization Services  ????: [phone]  ????????: https://[email or web address]/  ???: ?????  ?????: ??? 8:00 AM - 4:00 PM ??? 8:00 AM - 4:00 PM ??? 8:00 AM - 4:00 PM ??? 8:00 AM - 4:00 PM ??? 8:00 AM - 4:00 PM  ??????: ????  ???????????: ??????? ????, ?????? ???? ???????? ????  ????? ???????: ???? ?????    ????????? ??????-??? ?????      [facility] - Drop-in  center or day shelter  [address] [place] [address] (????: 12.3 ??????)  ????: [phone]  ???: ?????  ??????: ????  ???????????: ??? ????????, ?????? ?????      Incorporated - Drop-in center or day shelter  [address] [place] [address] (????: 11.5 ??????)  ????: [phone]  ???: ?????  ??????: ????      LOVE - LAUNDRY LOVE  ????????: http://www.SphereUp.org               ???????? ??????? ????? ????????????        ??????? ?????  911  .   ???????? ??  211 http://[email or web address].org  .   ??????? ????????  [phone] http://mnpoison.org http://wisconsinpoison.org  .     ???????? ????? ???????? ?????????  988 http://988Valley Healthline.org  .   ?????? ?????? ?????? ?????? ??????? ?????????  [phone] http://Childhelphotline.org   .   ????? ?????? ???????? ?????????  [phone] (????) http://Rainn.org   .     ?????? ?????? ?????????  [phone] (??????) http://1800runaway.org  .   ????? ????? ?????????? ??????  ????/???????? [phone] [place]: http://ppsupportmn.org [place]: http://Everlater.com/wi  .   ???? ???????? ???????????? ????? ??????????? ([facility])  972-972-HELP (6730) http://Findtreatment.gov   .                ???????: ? ?????? ?????? ??? ???????????? ?????? ????????????????. ?????? ??? ? ????? ???????? ????????? ? ??????? ????????????? ?????????. ? ????? ???????? ????????? ????? ???? ?????????? ??????? ???? ?? ??????? ???? ??????????? ???????????????? ?????? ?? ???? ??????.    UTC

## 2024-04-11 NOTE — COMMUNITY RESOURCES LIST (ENGLISH)
April 11, 2024           YOUR PERSONALIZED LIST OF SERVICES & PROGRAMS           & SHELTER    Housing      [facility] ([facility]) - Adult Shelter Connect (ASC)  [address] [place] [address] (Distance: 11.3 miles)  Phone: [phone]  Website: https://www.[facility].org/our-programs/adult-shelter-connect-[place]-shelter/  Language: English, Italian  Fee: Free      - Domestic violence shelter  [address] [place] [address] (Distance: 9.7 miles)  Phone: [phone]  Language: English  Fee: Free  Accessibility: Blind accommodation, Deaf or hard of hearing, Ada accessible, Translation services      [facility] - Housing Stabilization Services  Phone: [phone] [phone]  Website: https://Souqalmal/Housing-Stabilization.html  Language: English  Hours: Mon 9:00 AM - 5:00 PM Tue 9:00 AM - 5:00 PM Wed 9:00 AM - 5:00 PM Thu 9:00 AM - 5:00 PM Fri 9:00 AM - 5:00 PM  Fee: Insurance  Accessibility: Deaf or hard of hearing, Translation services    Case Management      Living - Housing Support-[facility] (HWS-I)  [address] [place] [address] (Distance: 10.9 miles)  Phone: [phone]  Website: https://www.Fosubo  Language: Divehi, English, Uruguayan  Fee: Insurance      [facility] [place] - Housing With Services Independent  [address] [place] [address] (Distance: 13.5 miles)  Phone: [phone]  Website: https://www.MediaSite.Engezni/contact  Language: English, Italian  Accessibility: Ada accessible, Blind accommodation, Deaf or hard of hearing, Translation services      [facility] - Housing Stabilization Services  Phone: [phone]  Website: https://homebasemn.com/  Language: English  Hours: Mon 8:00 AM - 4:00 PM Tue 8:00 AM - 4:00 PM Wed 8:00 AM - 4:00 PM Thu 8:00 AM - 4:00 PM Fri 8:00 AM - 4:00 PM  Fee: Free  Accessibility: Blind accommodation, Deaf or hard of hearing  Transportation Options: Free  transportation    Drop-In Services      [facility] - Drop-in center or day shelter  [address] [place] [address] (Distance: 12.3 miles)  Phone: [phone]  Language: English  Fee: Free  Accessibility: Ada accessible, Translation services      [facility] - Drop-in center or day shelter  [address] [place] [address] (Distance: 11.5 miles)  Phone: [phone]  Language: English  Fee: Free      LOVE - LAUNDRY LOVE  Website: http://www.laundrylove.org               IMPORTANT NUMBERS & WEBSITES        Emergency Services  911  .   [employer]  211 http://211unitedway.org  .   Poison Control  (266) 436-7674 http://[place]poison.org http://wisconsinpoison.org  .     Suicide and Crisis Lifeline  988 http://988Shuropodyline.org  .   Childhelp [place] Child Abuse Hotline  808.569.6535 http://Childhelphotline.org   .   National Sexual Assault Hotline  (439) 230-9420 (HOPE) http://Affirmn.org   .     National Runaway Safeline  (660) 210-8703 (RUNAWAY) http://AudioMicroruKakao Corp.YouBeauty  .   Pregnancy & Postpartum Support  Call/text 207-348-4129  [place]: http://ppsupportmn.org  [place]: http://Bizimply.com/wi  .   Substance Abuse National Helpline ([facility])  777-932-HELP (0974) http://Findtreatment.gov   .                DISCLAIMER: These resources have been generated via the [employer] Platform. [employer] does not endorse any service providers mentioned in this resource list. [employer] does not guarantee that the services mentioned in this resource list will be available to you or will improve your health or wellness.    [facility]

## 2024-04-11 NOTE — PROGRESS NOTES
Preventive Care Visit  [facility] REDD Hernandez MD, Internal Medicine  Apr 11, 2024        Assessment and Plan  1. Routine general medical examination at a health care facility    Last seen patient in March 2023 for annual physical, she is here for the same today.  Pertinent lab work done at that time showing positive for iron deficiency anemia whereas all other labs were normal including CMP, lipid panel, TSH, vitamin B12 and D, Pap smear done at that time.  Patient does have fibroid uterus with menstrual cramps the risk factor causing possibly iron deficiency due to this.    Recent ER visit on March 5, 2024 showing that she presented with dizziness to the emergency room had been vomiting and ongoing nausea.  All the workup including EKG was done, discharged home with meclizine after receiving IV fluids.      - REVIEW OF HEALTH MAINTENANCE PROTOCOL ORDERS  - Comprehensive metabolic panel (BMP + Alb, Alk Phos, ALT, AST, Total. Bili, TP); Future  - CBC with platelets; Future  - Vitamin D deficiency screening; Future  - Iron and iron binding capacity; Future  - TSH with free T4 reflex; Future  - Lipid panel reflex to direct LDL Fasting; Future  - Hemoglobin A1c; Future  - Comprehensive metabolic panel (BMP + Alb, Alk Phos, ALT, AST, Total. Bili, TP)  - CBC with platelets  - Vitamin D deficiency screening  - Iron and iron binding capacity  - TSH with free T4 reflex  - Lipid panel reflex to direct LDL Fasting  - Hemoglobin A1c    2. Uterine leiomyoma, unspecified location  Ongoing problem, with symptoms of dizzy spells and iron deficiency.  Shared decision for recheck of this before placing referral to gynecology at this time.  Patient states that this [place] when it was small in size at the time.  Will go ahead and recheck at this time for further recommendations.  Ultrasound transvaginal pelvic placed.  UPDATE - Ultrasound uterus is showing  pretty significant size of Fibroid Uterus of 7.4 cm . Recommend OBGYN appointment at the earliest given your symptoms. Referral placed.  - CBC with platelets; Future  - Iron and iron binding capacity; Future  - TSH with free T4 reflex; Future  - CBC with platelets  - Iron and iron binding capacity  - TSH with free T4 reflex  - US Pelvic Complete with Transvaginal; Future  - Ob/Gyn  Referral; Future    3. Vitamin D deficiency  - Vitamin D deficiency screening; Future  - Vitamin D deficiency screening    4. Iron deficiency anemia, unspecified iron deficiency anemia type  - Iron and iron binding capacity; Future  - Iron and iron binding capacity    5. Encounter for screening for lipid disorder  - Lipid panel reflex to direct LDL Fasting; Future  - Lipid panel reflex to direct LDL Fasting    6. IFG (impaired fasting glucose)  - Hemoglobin A1c; Future  - Hemoglobin A1c    7. Dermatitis  Ongoing chronic problem, uncontrolled with flareups at this time.  Given the pigmented nature of the lesion on the back of the trunk measuring around 6 cm in diameter, also skin lesion around 2 cm size behind the left lobule of the ear which is most likely turning into a keloid along with itchiness.    - Emphasized on need for possible need of biopsy given the chronicity and discoloration of pigmentation, suspicious of malignancy cannot be excluded.  Will go ahead and place new referral to dermatology as the previous referral placed patient is not very happy and requesting for an external dermatology who can take care of this.  Will do as requested.  -Given that current topical applications are not helping we will send a high potency steroid for topical application in the meantime till she sees dermatology.  Patient understood and agreed with the plan.  - clobetasol propionate (TEMOVATE) 0.05 % external cream; Apply topically 2 times daily  Dispense: 60 g; Refill: 1  - Adult Dermatology  Referral; Future         Please  note that this note consists of symbols derived from keyboarding, dictation and/or voice recognition software. As a result, there may be errors in the script that have gone undetected. Please consider this when interpreting information found in this chart.    Patient Instructions   As discussed, please do fasting labs placed.     Will check US of uterus to make sure no worsening.     Will start on clobetasol for ear and back skin lesion - may need biopsy . Placed another referral as requested - Pt requesting for External providers outside [place] as last provider didnt help much.     For scheduling at [facility] ([facility], [facility]), call [phone] or [phone]     For scheduling in the [place] ([facility], and [place]) call  [phone] or  [phone]        For scheduling in the South ([facility], [facility]) call [phone]  or   [phone]        =========================================    Preventive Care Advice   This is general advice given by our system to help you stay healthy. However, your care team may have specific advice just for you. Please talk to your care team about your preventive care needs.  Nutrition  Eat 5 or more servings of fruits and vegetables each day.  Try wheat bread, brown rice and whole grain pasta (instead of white bread, rice, and pasta).  Get enough calcium and vitamin D. Check the label on foods and aim for 100% of the RDA (recommended daily allowance).  Lifestyle  Exercise at least 150 minutes each week   (30 minutes a day, 5 days a week).  Do muscle strengthening activities 2 days a week. These help control your weight and prevent disease.  No smoking.  Wear sunscreen to prevent skin cancer.  Have a dental exam and cleaning every 6 months.  Yearly exams  See your health care team every year to talk about:  Any changes in your health.  Any medicines your care team has  prescribed.  Preventive care, family planning, and ways to prevent chronic diseases.  Shots (vaccines)   HPV shots (up to age 26), if you've never had them before.  Hepatitis B shots (up to age 59), if you've never had them before.  COVID-19 shot: Get this shot when it's due.  Flu shot: Get a flu shot every year.  Tetanus shot: Get a tetanus shot every 10 years.  Pneumococcal, hepatitis A, and RSV shots: Ask your care team if you need these based on your risk.  Shingles shot (for age 50 and up).  General health tests  Diabetes screening:  Starting at age 35, Get screened for diabetes at least every 3 years.  If you are younger than age 35, ask your care team if you should be screened for diabetes.  Cholesterol test: At age 39, start having a cholesterol test every 5 years, or more often if advised.  Bone density scan (DEXA): At age 50, ask your care team if you should have this scan for osteoporosis (brittle bones).  Hepatitis C: Get tested at least once in your life.  STIs (sexually transmitted infections)  Before age 24: Ask your care team if you should be screened for STIs.  After age 24: Get screened for STIs if you're at risk. You are at risk for STIs (including HIV) if:  You are sexually active with more than one person.  You don't use condoms every time.  You or a partner was diagnosed with a sexually transmitted infection.  If you are at risk for HIV, ask about PrEP medicine to prevent HIV.  Get tested for HIV at least once in your life, whether you are at risk for HIV or not.  Cancer screening tests  Cervical cancer screening: If you have a cervix, begin getting regular cervical cancer screening tests at age 21. Most people who have regular screenings with normal results can stop after age 65. Talk about this with your provider.  Breast cancer scan (mammogram): If you've ever had breasts, begin having regular mammograms starting at age 40. This is a scan to check for breast cancer.  Colon cancer screening:  It is important to start screening for colon cancer at age 45.  Have a colonoscopy test every 10 years (or more often if you're at risk) Or, ask your provider about stool tests like a FIT test every year or Cologuard test every 3 years.  To learn more about your testing options, visit: https://www.zweitgeist/358576.pdf.  For help making a decision, visit: https://bit.ly/my37787.  Prostate cancer screening test: If you have a prostate and are age 55 to 69, ask your provider if you would benefit from a yearly prostate cancer screening test.  Lung cancer screening: If you are a current or former smoker age 50 to 80, ask your care team if ongoing lung cancer screenings are right for you.  For informational purposes only. Not to replace the advice of your health care provider. Copyright   2023 [employer]. All rights reserved. Clinically reviewed by the [facility] Transitions Program. [employer] [id number] - REV 01/24.    Return in 1 year (on 4/11/2025), or if symptoms worsen or fail to improve, for Preventative Visit.    Raquel Hernandez MD  [facility] REDD Francisco is a 38 year old, presenting for the following:  Physical (Fasting.)        4/11/2024     9:17 AM   Additional Questions   Roomed by Negin WHARTON   Accompanied by Her spouse        Health Care Directive  Patient does not have a Health Care Directive or Living Will: Discussed advance care planning with patient; information given to patient to review.    HPI          4/11/2024   General Health   How would you rate your overall physical health? Good   Feel stress (tense, anxious, or unable to sleep) Not at all         4/11/2024   Nutrition   Three or more servings of calcium each day? Yes   Diet: Vegetarian/vegan   How many servings of fruit and vegetables per day? (!) 2-3   How many sweetened beverages each day? 0-1         4/11/2024   Exercise   Days per week of moderate/strenous exercise 2 days   Average  minutes spent exercising at this level 30 min   (!) EXERCISE CONCERN      4/11/2024   Social Factors   Frequency of gathering with friends or relatives Once a week   Worry food won't last until get money to buy more No   Food not last or not have enough money for food? No   Do you have housing?  No   Are you worried about losing your housing? No   Lack of transportation? No   Unable to get utilities (heat,electricity)? No   Want help with housing or utility concern? No   (!) HOUSING CONCERN PRESENT      4/11/2024   Dental   Dentist two times every year? (!) NO         4/11/2024   TB Screening   Were you born outside of the [place]? Yes         Today's PHQ-2 Score:       4/11/2024     9:11 AM   PHQ-2 ( 1999 [employer])   Q1: Little interest or pleasure in doing things 0   Q2: Feeling down, depressed or hopeless 0   PHQ-2 Score 0   Q1: Little interest or pleasure in doing things Not at all   Q2: Feeling down, depressed or hopeless Not at all   PHQ-2 Score 0           4/11/2024   Substance Use   Alcohol more than 3/day or more than 7/wk Not Applicable   Do you use any other substances recreationally? No     Social History     Tobacco Use    Smoking status: Never    Smokeless tobacco: Never   Vaping Use    Vaping status: Never Used   Substance Use Topics    Alcohol use: Never    Drug use: Never          Mammogram Screening - Patient under 40 years of age: Routine Mammogram Screening not recommended.         4/11/2024   STI Screening   New sexual partner(s) since last STI/HIV test? No     History of abnormal Pap smear: NO - age 30-65 PAP every 5 years with negative HPV co-testing recommended        Latest Ref Rng & Units 3/2/2023    11:26 AM   PAP / HPV   PAP  Negative for Intraepithelial Lesion or Malignancy (NILM)    HPV 16 DNA Negative Negative    HPV 18 DNA Negative Negative    Other HR HPV Negative Negative            4/11/2024   Contraception/Family Planning   Questions about contraception or family planning No       "  Reviewed and updated as needed this visit by Provider   Tobacco  Allergies  Meds  Problems  Med Hx  Surg Hx  Fam Hx            History reviewed. No pertinent past medical history.  History reviewed. No pertinent surgical history.  OB History   No obstetric history on file.     Lab work is in process  Labs reviewed in EPIC  BP Readings from Last 3 Encounters:   04/11/24 123/82   03/05/24 137/71   03/02/23 115/73    Wt Readings from Last 3 Encounters:   04/11/24 53.6 kg (118 lb 3.2 oz)   03/02/23 51.3 kg (113 lb)   08/06/21 56.4 kg (124 lb 6.4 oz)                  Patient Active Problem List   Diagnosis    Uterine leiomyoma, unspecified location    Vitamin D deficiency    Iron deficiency    Iron deficiency anemia, unspecified iron deficiency anemia type     History reviewed. No pertinent surgical history.    Social History     Tobacco Use    Smoking status: Never    Smokeless tobacco: Never   Substance Use Topics    Alcohol use: Never     History reviewed. No pertinent family history.      Current Outpatient Medications   Medication Sig Dispense Refill    clobetasol propionate (TEMOVATE) 0.05 % external cream Apply topically 2 times daily 60 g 1    Vitamin D, Cholecalciferol, 10 MCG (400 UNIT) TABS        No Known Allergies  Recent Labs   Lab Test 04/11/24  1005 03/05/24  0359 03/02/23  1214 08/11/21  1025   A1C 5.5  --   --   --    LDL  --   --  86 81   HDL  --   --  54 52   TRIG  --   --  84 105   ALT  --  13 19 24   CR  --  0.88 0.80 0.90   GFRESTIMATED  --  86 >90 83   POTASSIUM  --  3.9 4.1 4.0   TSH  --   --  2.18 3.93          Review of Systems  Constitutional, HEENT, cardiovascular, pulmonary, GI, , musculoskeletal, neuro, skin, endocrine and psych systems are negative, except as otherwise noted.     Objective    Exam  /82   Pulse 69   Temp 98.2  F (36.8  C) (Temporal)   Resp 14   Ht 1.545 m (5' 0.83\")   Wt 53.6 kg (118 lb 3.2 oz)   LMP 03/31/2024 (Exact Date)   SpO2 100%   BMI 22.46 " "kg/m     Estimated body mass index is 22.46 kg/m  as calculated from the following:    Height as of this encounter: 1.545 m (5' 0.83\").    Weight as of this encounter: 53.6 kg (118 lb 3.2 oz).    Physical Exam  GENERAL: alert and no distress  EYES: Eyes grossly normal to inspection, PERRL and conjunctivae and sclerae normal  HENT: ear canals and TM's normal, nose and mouth without ulcers or lesions  NECK: no adenopathy, no asymmetry, masses, or scars  RESP: lungs clear to auscultation - no rales, rhonchi or wheezes  BREAST: normal without masses, tenderness or nipple discharge and no palpable axillary masses or adenopathy  CV: regular rate and rhythm, normal S1 S2, no S3 or S4, no murmur, click or rub, no peripheral edema  ABDOMEN: soft, nontender, no hepatosplenomegaly, no masses and bowel sounds normal  MS: no gross musculoskeletal defects noted, no edema  SKIN: POSITIVE for bilateral melasma on the face and pigmented nature of the lesion on the back of the trunk measuring around 6 cm in diameter, also skin lesion around 2 cm size behind the left lobule of the ear which is most likely turning into a keloid along with itchiness.    NEURO: Normal strength and tone, mentation intact and speech normal  PSYCH: mentation appears normal, affect normal/bright        Signed Electronically by: Raquel Hernandez MD    "

## 2024-04-11 NOTE — PATIENT INSTRUCTIONS
As discussed, please do fasting labs placed.     Will check US of uterus to make sure no worsening.     Will start on clobetasol for ear and back skin lesion - may need biopsy . Placed another referral as requested - Pt requesting for External providers outside [place] as last provider didnt help much.     For scheduling at [facility] ([facility], [place]), call [phone] or [phone]     For scheduling in the [place] ([place], [facility], and [place]) call  [phone] or  [phone]        For scheduling in the South ([facility], [facility]) call [phone]  or   [phone]        =========================================    Preventive Care Advice   This is general advice given by our system to help you stay healthy. However, your care team may have specific advice just for you. Please talk to your care team about your preventive care needs.  Nutrition  Eat 5 or more servings of fruits and vegetables each day.  Try wheat bread, brown rice and whole grain pasta (instead of white bread, rice, and pasta).  Get enough calcium and vitamin D. Check the label on foods and aim for 100% of the RDA (recommended daily allowance).  Lifestyle  Exercise at least 150 minutes each week   (30 minutes a day, 5 days a week).  Do muscle strengthening activities 2 days a week. These help control your weight and prevent disease.  No smoking.  Wear sunscreen to prevent skin cancer.  Have a dental exam and cleaning every 6 months.  Yearly exams  See your health care team every year to talk about:  Any changes in your health.  Any medicines your care team has prescribed.  Preventive care, family planning, and ways to prevent chronic diseases.  Shots (vaccines)   HPV shots (up to age 26), if you've never had them before.  Hepatitis B shots (up to age 59), if you've never had them before.  COVID-19 shot: Get this shot when it's due.  Flu shot: Get a  flu shot every year.  Tetanus shot: Get a tetanus shot every 10 years.  Pneumococcal, hepatitis A, and RSV shots: Ask your care team if you need these based on your risk.  Shingles shot (for age 50 and up).  General health tests  Diabetes screening:  Starting at age 35, Get screened for diabetes at least every 3 years.  If you are younger than age 35, ask your care team if you should be screened for diabetes.  Cholesterol test: At age 39, start having a cholesterol test every 5 years, or more often if advised.  Bone density scan (DEXA): At age 50, ask your care team if you should have this scan for osteoporosis (brittle bones).  Hepatitis C: Get tested at least once in your life.  STIs (sexually transmitted infections)  Before age 24: Ask your care team if you should be screened for STIs.  After age 24: Get screened for STIs if you're at risk. You are at risk for STIs (including HIV) if:  You are sexually active with more than one person.  You don't use condoms every time.  You or a partner was diagnosed with a sexually transmitted infection.  If you are at risk for HIV, ask about PrEP medicine to prevent HIV.  Get tested for HIV at least once in your life, whether you are at risk for HIV or not.  Cancer screening tests  Cervical cancer screening: If you have a cervix, begin getting regular cervical cancer screening tests at age 21. Most people who have regular screenings with normal results can stop after age 65. Talk about this with your provider.  Breast cancer scan (mammogram): If you've ever had breasts, begin having regular mammograms starting at age 40. This is a scan to check for breast cancer.  Colon cancer screening: It is important to start screening for colon cancer at age 45.  Have a colonoscopy test every 10 years (or more often if you're at risk) Or, ask your provider about stool tests like a FIT test every year or Cologuard test every 3 years.  To learn more about your testing options, visit:  https://www.[employer]/563199.pdf.  For help making a decision, visit: https://bit.ly/lg98665.  Prostate cancer screening test: If you have a prostate and are age 55 to 69, ask your provider if you would benefit from a yearly prostate cancer screening test.  Lung cancer screening: If you are a current or former smoker age 50 to 80, ask your care team if ongoing lung cancer screenings are right for you.  For informational purposes only. Not to replace the advice of your health care provider. Copyright   2023 [employer]. All rights reserved. Clinically reviewed by the [facility] Transitions Program. [employer] [id number] - REV 01/24.

## 2024-04-12 LAB
ALBUMIN SERPL BCG-MCNC: 4.4 G/DL (ref 3.5–5.2)
ALP SERPL-CCNC: 43 U/L (ref 40–150)
ALT SERPL W P-5'-P-CCNC: 18 U/L (ref 0–50)
ANION GAP SERPL CALCULATED.3IONS-SCNC: 11 MMOL/L (ref 7–15)
AST SERPL W P-5'-P-CCNC: 25 U/L (ref 0–45)
BILIRUB SERPL-MCNC: 0.3 MG/DL
BUN SERPL-MCNC: 6.1 MG/DL (ref 6–20)
CALCIUM SERPL-MCNC: 9.2 MG/DL (ref 8.6–10)
CHLORIDE SERPL-SCNC: 103 MMOL/L (ref 98–107)
CHOLEST SERPL-MCNC: 143 MG/DL
CREAT SERPL-MCNC: 0.86 MG/DL (ref 0.51–0.95)
DEPRECATED HCO3 PLAS-SCNC: 24 MMOL/L (ref 22–29)
EGFRCR SERPLBLD CKD-EPI 2021: 88 ML/MIN/1.73M2
FASTING STATUS PATIENT QL REPORTED: YES
GLUCOSE SERPL-MCNC: 76 MG/DL (ref 70–99)
HDLC SERPL-MCNC: 54 MG/DL
IRON BINDING CAPACITY (ROCHE): 479 UG/DL (ref 240–430)
IRON SATN MFR SERPL: 8 % (ref 15–46)
IRON SERPL-MCNC: 39 UG/DL (ref 37–145)
LDLC SERPL CALC-MCNC: 74 MG/DL
NONHDLC SERPL-MCNC: 89 MG/DL
POTASSIUM SERPL-SCNC: 4.2 MMOL/L (ref 3.4–5.3)
PROT SERPL-MCNC: 7.5 G/DL (ref 6.4–8.3)
SODIUM SERPL-SCNC: 138 MMOL/L (ref 135–145)
TRIGL SERPL-MCNC: 77 MG/DL
TSH SERPL DL<=0.005 MIU/L-ACNC: 2.19 UIU/ML (ref 0.3–4.2)
VIT D+METAB SERPL-MCNC: 37 NG/ML (ref 20–50)

## 2024-04-13 RX ORDER — FERROUS SULFATE 325(65) MG
325 TABLET, DELAYED RELEASE (ENTERIC COATED) ORAL DAILY
Qty: 90 TABLET | Refills: 1 | Status: SHIPPED | OUTPATIENT
Start: 2024-04-13

## 2024-04-17 ENCOUNTER — HOSPITAL ENCOUNTER (OUTPATIENT)
Dept: ULTRASOUND IMAGING | Facility: CLINIC | Age: 39
Discharge: HOME | End: 2024-04-17
Attending: INTERNAL MEDICINE | Admitting: INTERNAL MEDICINE
Payer: COMMERCIAL

## 2024-04-17 DIAGNOSIS — D25.9 UTERINE LEIOMYOMA, UNSPECIFIED LOCATION: ICD-10-CM

## 2024-04-17 PROCEDURE — 76830 TRANSVAGINAL US NON-OB: CPT

## 2024-04-17 PROCEDURE — 76856 US EXAM PELVIC COMPLETE: CPT

## 2024-05-02 NOTE — PROGRESS NOTES
[facility]  OB/GYN Clinic Note    SUBJECTIVE:                                                   Maryam Tomlin is a 38 year old female who presents to clinic today for the following health issue(s):  Patient presents with:  Consult: Referred by PCP      Additional information: fibroids 4/17/24  FINDINGS:      UTERUS: 10.1 x 6.7 x 4.2 cm. Right uterine predominantly intramural fibroid measuring 7.4 x 6.6 x 7.8 cm.     ENDOMETRIUM: 8 mm. Internal mobile echogenic debris.     RIGHT OVARY: 2.4 x 1.8 x 1.2 cm. Normal.     LEFT OVARY: 2.3 x 1.7 x 1.4 cm. Normal.     Trace pelvic free fluid.     HPI:  Seen today for large fibroid. In [place], was diagnosed with a fibroid, which was small. Fibroid was incidentally found. Had comprehensive testing prior to travel- her 's company requires extensive testing prior to traveling abroad, and [place] was part of this evaluation.     Maryam states she is asymptomatic from the fibroid. She has regular monthly periods.. Periods are not heavy-using 2 pads total daily, wears a pad for about 10 hours before it is saturated on the heaviest days, which is about 4 days. Has spotting for about 8 days. Periods are predictable and monthly, but occasionally arrive about 10 days sooner than expected. Does state she has noticed decreased energy during her periods. Denies fatigue, lightheadedness, dizziness, or mood changes.   Has one child who is 15 years old. Not preventing pregnancy, but not planning pregnancy.   Is being evaluated/treated for BLAKE by PCP.     Answers submitted by the patient for this visit:  Patient Health Questionnaire (Submitted on 5/16/2024)  If you checked off any problems, how difficult have these problems made it for you to do your work, take care of things at home, or get along with other people?: Not difficult at all  PHQ9 TOTAL SCORE: 0  BETHANY-7 (Submitted on 5/16/2024)  BETHANY 7 TOTAL SCORE: 0    Patient's last menstrual period was 04/21/2024  "(exact date)..   Patient is sexually active, No obstetric history on file..  Using none for contraception.    reports that she has never smoked. She has never used smokeless tobacco.  STD testing offered?  Declined  Health maintenance updated:  yes    Problem list and histories reviewed & adjusted, as indicated.  Additional history: as documented.    Patient Active Problem List   Diagnosis    Uterine leiomyoma, unspecified location    Vitamin D deficiency    Iron deficiency    Iron deficiency anemia, unspecified iron deficiency anemia type     No past surgical history on file.   Social History     Tobacco Use    Smoking status: Never    Smokeless tobacco: Never   Substance Use Topics    Alcohol use: Never      No data available              Current Outpatient Medications   Medication Sig Dispense Refill    clobetasol propionate (TEMOVATE) 0.05 % external cream Apply topically 2 times daily 60 g 1    ferrous sulfate (FE TABS) 325 (65 Fe) MG EC tablet Take 1 tablet (325 mg) by mouth daily 90 tablet 1    Vitamin D, Cholecalciferol, 10 MCG (400 UNIT) TABS        No current facility-administered medications for this visit.     No Known Allergies        OBJECTIVE:     /80   Ht 1.545 m (5' 0.83\")   Wt 52.5 kg (115 lb 12.8 oz)   LMP 2024 (Exact Date)   BMI 22.00 kg/m    Body mass index is 22 kg/m .    Exam:  Constitutional:  Appearance: Well nourished, well developed alert, in no acute distress       ASSESSMENT/PLAN:                                                        ICD-10-CM    1. Uterine leiomyoma, unspecified location  D25.9 Ob/Gyn  Referral      2. Iron deficiency  E61.1 Ob/Gyn  Referral        Maryam Tomlin is a 38 year old  with incidental finding of enlarged fibroid uterus. US images personally reviewed- right anterior enlarged discrete fibroid. Reviewed fibroid anatomy/physiology with patient. She is asymptomatic from this fibroid. Extensively reviewed menstrual " history due to hx of BLAKE. Does not have heavy bleeding related to fibroid. AUB does not seem to be source of BLAKE. Nonetheless, reviewed options for menstrual suppression including contraception. She is not interested in contraception this time. Given she is asymptomatic from fibroid, it is reasonable to proceed with expectant management. Discussed that no specific monitoring is needed for fibroid. There is no absolute size cut off at which surgery or intervention is recommended. Reviewed that management of fibroids is for symptoms. If she develops heavy bleeding, pain, pelvic pressure can consider treatment. Reviewed treatment options. include medical or surgical management. Discussed that medication to prevent growth or reduce size of fibroid is indicated if she has symptoms, but not otherwise.     Audra Giron MD, MHS  [facility] [place]  05/16/24

## 2024-05-16 ENCOUNTER — OFFICE VISIT (OUTPATIENT)
Dept: OBGYN | Facility: CLINIC | Age: 39
End: 2024-05-16
Attending: INTERNAL MEDICINE
Payer: COMMERCIAL

## 2024-05-16 VITALS
SYSTOLIC BLOOD PRESSURE: 118 MMHG | DIASTOLIC BLOOD PRESSURE: 80 MMHG | WEIGHT: 115.8 LBS | HEIGHT: 61 IN | BODY MASS INDEX: 21.86 KG/M2

## 2024-05-16 DIAGNOSIS — E61.1 IRON DEFICIENCY: ICD-10-CM

## 2024-05-16 DIAGNOSIS — D25.9 UTERINE LEIOMYOMA, UNSPECIFIED LOCATION: ICD-10-CM

## 2024-05-16 PROCEDURE — 99203 OFFICE O/P NEW LOW 30 MIN: CPT | Performed by: STUDENT IN AN ORGANIZED HEALTH CARE EDUCATION/TRAINING PROGRAM

## 2024-05-16 ASSESSMENT — ANXIETY QUESTIONNAIRES
6. BECOMING EASILY ANNOYED OR IRRITABLE: NOT AT ALL
7. FEELING AFRAID AS IF SOMETHING AWFUL MIGHT HAPPEN: NOT AT ALL
5. BEING SO RESTLESS THAT IT IS HARD TO SIT STILL: NOT AT ALL
IF YOU CHECKED OFF ANY PROBLEMS ON THIS QUESTIONNAIRE, HOW DIFFICULT HAVE THESE PROBLEMS MADE IT FOR YOU TO DO YOUR WORK, TAKE CARE OF THINGS AT HOME, OR GET ALONG WITH OTHER PEOPLE: NOT DIFFICULT AT ALL
GAD7 TOTAL SCORE: 0
2. NOT BEING ABLE TO STOP OR CONTROL WORRYING: NOT AT ALL
7. FEELING AFRAID AS IF SOMETHING AWFUL MIGHT HAPPEN: NOT AT ALL
4. TROUBLE RELAXING: NOT AT ALL
GAD7 TOTAL SCORE: 0
3. WORRYING TOO MUCH ABOUT DIFFERENT THINGS: NOT AT ALL
GAD7 TOTAL SCORE: 0
8. IF YOU CHECKED OFF ANY PROBLEMS, HOW DIFFICULT HAVE THESE MADE IT FOR YOU TO DO YOUR WORK, TAKE CARE OF THINGS AT HOME, OR GET ALONG WITH OTHER PEOPLE?: NOT DIFFICULT AT ALL
1. FEELING NERVOUS, ANXIOUS, OR ON EDGE: NOT AT ALL

## 2024-05-16 ASSESSMENT — PATIENT HEALTH QUESTIONNAIRE - PHQ9
10. IF YOU CHECKED OFF ANY PROBLEMS, HOW DIFFICULT HAVE THESE PROBLEMS MADE IT FOR YOU TO DO YOUR WORK, TAKE CARE OF THINGS AT HOME, OR GET ALONG WITH OTHER PEOPLE: NOT DIFFICULT AT ALL
SUM OF ALL RESPONSES TO PHQ QUESTIONS 1-9: 0
SUM OF ALL RESPONSES TO PHQ QUESTIONS 1-9: 0

## 2024-07-16 ENCOUNTER — NURSE TRIAGE (OUTPATIENT)
Dept: FAMILY MEDICINE | Facility: CLINIC | Age: 39
End: 2024-07-16
Payer: COMMERCIAL

## 2024-07-16 NOTE — TELEPHONE ENCOUNTER
Provider Response to 2nd Level Triage Request    I have reviewed the RN documentation. My recommendation is:  Refer to Urgent Care >> I had an opening at 9:30 AM in the morning today but unfortunately this was at 10:30 AM.  If any opening on PCPs at any nearby location today okay to use that same-day spot for acute visit.    Thank you,  Raquel Hernandez MD on 7/16/2024 at 10:58 AM

## 2024-07-16 NOTE — TELEPHONE ENCOUNTER
Spoke to pt, helped schedule appointment for 7/19/24 with PCP. 7/19/24 is what worked best with pt schedule.     Judie Francois RN on 7/16/2024 at 3:18 PM

## 2024-07-16 NOTE — TELEPHONE ENCOUNTER
"Dr. Hernandez - please review and advise.    Pt is refusing 911/ED for her reported intermittent chest pain, requesting your review before taking action.    Thoughts on disposition - ED? Work in same day?    Reason for Disposition   Pain also in shoulder(s) or arm(s) or jaw    Additional Information   Negative: SEVERE chest pain   Negative: Major surgery in the past month   Negative: Cocaine use within last 3 days   Negative: Difficulty breathing   Negative: Hip or leg fracture (broken bone) in past month (or had cast on leg or ankle in past month)   Negative: Long-distance travel in past month (e.g., car, bus, train, plane; with trip lasting 6 or more hours)    Answer Assessment - Initial Assessment Questions  1. LOCATION: \"Where does it hurt?\"        Left sided  2. RADIATION: \"Does the pain go anywhere else?\" (e.g., into neck, jaw, arms, back)      Down left arm  3. ONSET: \"When did the chest pain begin?\" (Minutes, hours or days)       Three days ago  4. PATTERN: \"Does the pain come and go, or has it been constant since it started?\"  \"Does it get worse with exertion?\"       Comes and goes  5. DURATION: \"How long does it last\" (e.g., seconds, minutes, hours)      Approx. 5 min  6. SEVERITY: \"How bad is the pain?\"  (e.g., Scale 1-10; mild, moderate, or severe)     - MODERATE (4-7): interferes with normal activities or awakens from sleep        5-6  7. CARDIAC RISK FACTORS: \"Do you have any history of heart problems or risk factors for heart disease?\" (e.g., angina, prior heart attack; diabetes, high blood pressure, high cholesterol, smoker, or strong family history of heart disease)      none  8. PULMONARY RISK FACTORS: \"Do you have any history of lung disease?\"  (e.g., blood clots in lung, asthma, emphysema, birth control pills)      none  9. CAUSE: \"What do you think is causing the chest pain?\"      unknown  10. OTHER SYMPTOMS: \"Do you have any other symptoms?\" (e.g., dizziness, nausea, vomiting, sweating, fever, " "difficulty breathing, cough)        denies  11. PREGNANCY: \"Is there any chance you are pregnant?\" \"When was your last menstrual period?\"        none    Protocols used: Chest Pain-A-[place]    Edwige Jenkins RN        "

## 2024-07-16 NOTE — TELEPHONE ENCOUNTER
Provider Response to 2nd Level Triage Request    I have reviewed the RN documentation. My recommendation is:  Face To Face Visit. Same Day: work patient in on my schedule as an overbook. At 8.30 AM or 11.30 AM on any of the days in 1 week.     Thank you  Raquel Hernandez MD on 7/16/2024 at 2:46 PM

## 2024-07-16 NOTE — TELEPHONE ENCOUNTER
Spoke with pt and spouse and are refusing UC or same day visit at [facility]. Pt is requesting to see only Dr. Hernandez. Is provider able to see pt this week?    Makayla Arreaga RN

## 2024-07-19 ENCOUNTER — OFFICE VISIT (OUTPATIENT)
Dept: FAMILY MEDICINE | Facility: CLINIC | Age: 39
End: 2024-07-19
Payer: COMMERCIAL

## 2024-07-19 ENCOUNTER — MYC MEDICAL ADVICE (OUTPATIENT)
Dept: FAMILY MEDICINE | Facility: CLINIC | Age: 39
End: 2024-07-19

## 2024-07-19 VITALS
BODY MASS INDEX: 21.28 KG/M2 | TEMPERATURE: 98.5 F | DIASTOLIC BLOOD PRESSURE: 81 MMHG | SYSTOLIC BLOOD PRESSURE: 120 MMHG | RESPIRATION RATE: 16 BRPM | HEART RATE: 74 BPM | HEIGHT: 61 IN | OXYGEN SATURATION: 100 % | WEIGHT: 112.7 LBS

## 2024-07-19 DIAGNOSIS — F41.9 ANXIETY: ICD-10-CM

## 2024-07-19 DIAGNOSIS — K21.9 GASTROESOPHAGEAL REFLUX DISEASE, UNSPECIFIED WHETHER ESOPHAGITIS PRESENT: ICD-10-CM

## 2024-07-19 DIAGNOSIS — R94.31 ABNORMAL ELECTROCARDIOGRAM: ICD-10-CM

## 2024-07-19 DIAGNOSIS — R07.9 CHEST PAIN, UNSPECIFIED TYPE: Primary | ICD-10-CM

## 2024-07-19 DIAGNOSIS — D50.9 IRON DEFICIENCY ANEMIA, UNSPECIFIED IRON DEFICIENCY ANEMIA TYPE: ICD-10-CM

## 2024-07-19 DIAGNOSIS — R94.31 ABNORMAL Q WAVES ON ELECTROCARDIOGRAM: ICD-10-CM

## 2024-07-19 DIAGNOSIS — M62.838 MUSCLE SPASM: ICD-10-CM

## 2024-07-19 LAB
IRON BINDING CAPACITY (ROCHE): 393 UG/DL (ref 240–430)
IRON SATN MFR SERPL: 31 % (ref 15–46)
IRON SERPL-MCNC: 123 UG/DL (ref 37–145)
TROPONIN T SERPL HS-MCNC: <6 NG/L

## 2024-07-19 PROCEDURE — 84484 ASSAY OF TROPONIN QUANT: CPT | Performed by: INTERNAL MEDICINE

## 2024-07-19 PROCEDURE — G2211 COMPLEX E/M VISIT ADD ON: HCPCS | Performed by: INTERNAL MEDICINE

## 2024-07-19 PROCEDURE — 83550 IRON BINDING TEST: CPT | Performed by: INTERNAL MEDICINE

## 2024-07-19 PROCEDURE — 83540 ASSAY OF IRON: CPT | Performed by: INTERNAL MEDICINE

## 2024-07-19 PROCEDURE — 99215 OFFICE O/P EST HI 40 MIN: CPT | Performed by: INTERNAL MEDICINE

## 2024-07-19 PROCEDURE — 93000 ELECTROCARDIOGRAM COMPLETE: CPT | Performed by: INTERNAL MEDICINE

## 2024-07-19 PROCEDURE — 36415 COLL VENOUS BLD VENIPUNCTURE: CPT | Performed by: INTERNAL MEDICINE

## 2024-07-19 RX ORDER — SERTRALINE HYDROCHLORIDE 25 MG/1
25 TABLET, FILM COATED ORAL DAILY
Qty: 30 TABLET | Refills: 1 | Status: SHIPPED | OUTPATIENT
Start: 2024-07-19

## 2024-07-19 RX ORDER — TIZANIDINE HYDROCHLORIDE 4 MG/1
4 CAPSULE, GELATIN COATED ORAL
Qty: 30 CAPSULE | Refills: 1 | Status: SHIPPED | OUTPATIENT
Start: 2024-07-19

## 2024-07-19 RX ORDER — FAMOTIDINE 20 MG/1
20 TABLET, FILM COATED ORAL 2 TIMES DAILY
Qty: 180 TABLET | Refills: 1 | Status: SHIPPED | OUTPATIENT
Start: 2024-07-19

## 2024-07-19 ASSESSMENT — PATIENT HEALTH QUESTIONNAIRE - PHQ9
SUM OF ALL RESPONSES TO PHQ QUESTIONS 1-9: 0
5. POOR APPETITE OR OVEREATING: NOT AT ALL

## 2024-07-19 ASSESSMENT — ANXIETY QUESTIONNAIRES
2. NOT BEING ABLE TO STOP OR CONTROL WORRYING: NOT AT ALL
5. BEING SO RESTLESS THAT IT IS HARD TO SIT STILL: NOT AT ALL
GAD7 TOTAL SCORE: 0
6. BECOMING EASILY ANNOYED OR IRRITABLE: NOT AT ALL
1. FEELING NERVOUS, ANXIOUS, OR ON EDGE: NOT AT ALL
3. WORRYING TOO MUCH ABOUT DIFFERENT THINGS: NOT AT ALL
IF YOU CHECKED OFF ANY PROBLEMS ON THIS QUESTIONNAIRE, HOW DIFFICULT HAVE THESE PROBLEMS MADE IT FOR YOU TO DO YOUR WORK, TAKE CARE OF THINGS AT HOME, OR GET ALONG WITH OTHER PEOPLE: NOT DIFFICULT AT ALL
7. FEELING AFRAID AS IF SOMETHING AWFUL MIGHT HAPPEN: NOT AT ALL
GAD7 TOTAL SCORE: 0

## 2024-07-19 ASSESSMENT — PAIN SCALES - GENERAL: PAINLEVEL: NO PAIN (0)

## 2024-07-19 NOTE — PATIENT INSTRUCTIONS
As discussed , will check your needed EKG , lab work before treating this as muscle spam versus anxiety.     ===================================

## 2024-07-19 NOTE — PROGRESS NOTES
Assessment and Plan  1. Chest pain, unspecified type  New problem, patient endorses that this started on 7/11/24 which was recurrent next day as well on the left lower chest. It was around more than 20 minutes for 5/10 severity , no radiation of pain.  Does have associated stressors with father unwell and patient has been stressing about it too much.  -Given the physical exam below will check EKG as well as troponins which patient has been declining to go to ER, patient understood and agreed with the plan.  All the complications and risks understood.  -Given the abnormal EKG showing old Q waves but no acute ST-T segment changes at this time.  Will consider echocardiogram as well as treat her anxiety and GERD symptoms if any causing this as well.  - EKG 12-lead complete w/read - Clinics  - Troponin T, High Sensitivity; Future  - Troponin T, High Sensitivity  - Echocardiogram Complete; Future    2. Abnormal electrocardiogram  3. Abnormal Q waves on electrocardiogram  New problems added to patient on list today given the EKG showing old Q waves in the anterior leads as well as ongoing symptoms will make sure to check echocardiogram for any wall motion abnormalities before considering stress test.  This is appears as new waves compared to the past in March 2024 EKG.  Fortunately troponin levels were normal.  Patient is notified on MyChart result note with the ER precautions..  - Echocardiogram Complete; Future    4. Iron deficiency anemia, unspecified iron deficiency anemia type  Possible worsening iron deficiency causing the above concerns.  Will recheck and treat if abnormal.  Patient endorses that she has been taking her oral iron therapy with complaints.  - Iron and iron binding capacity; Future  - Iron and iron binding capacity    5. Anxiety  New problem added to patient on list given the concerns of stressors in her life which is causing the above symptoms.  Will treat this as anxiety with the low-dose Zoloft.   Follow-up instructions given.  - sertraline (ZOLOFT) 25 MG tablet; Take 1 tablet (25 mg) by mouth daily  Dispense: 30 tablet; Refill: 1    6. Muscle spasm  Possible muscle spasm of the chest wall cannot be excluded, patient endorses that when she massages her muscle on the chest wall it gives her relief.  Will give muscle laxer, side effects explained.  - tiZANidine (ZANAFLEX) 4 MG capsule; Take 1 capsule (4 mg) by mouth nightly as needed for muscle spasms  Dispense: 30 capsule; Refill: 1    7. Gastroesophageal reflux disease, unspecified whether esophagitis present  New problem, given that patient has not been having her diet properly with stressors as well as fasting for her rituals will consider famotidine for improvement.  - famotidine (PEPCID) 20 MG tablet; Take 1 tablet (20 mg) by mouth 2 times daily  Dispense: 180 tablet; Refill: 1       Over 40 minutes spent on reviewing patient chart,  face to face encounter, greater than 50% time spent with plan/cordination of care and documentation as above in my A/P.         The longitudinal plan of care for the diagnosis(es)/condition(s) as documented were addressed during this visit. Due to the added complexity in care, I will continue to support Maryam in the subsequent management and with ongoing continuity of care.        Please note that this note consists of symbols derived from keyboarding, dictation and/or voice recognition software. As a result, there may be errors in the script that have gone undetected. Please consider this when interpreting information found in this chart.    Patient Instructions   As discussed , will check your needed EKG , lab work before treating this as muscle spam versus anxiety.     ===================================      Return in about 38 weeks (around 4/11/2025), or if symptoms worsen or fail to improve, for Follow up of last visit, Preventative Visit.    Raquel Hernandez MD  [facility]  DEMETRIS Francisco is a 38 year old, presenting for the following health issues:  Chest Pain (On and off, aching, left side under breast, going on for two days, no treatment tried.)        7/19/2024    10:22 AM   Additional Questions   Roomed by Negin WHARTON     History of Present Illness       Reason for visit:  Uneasy feeling    She eats 2-3 servings of fruits and vegetables daily.She consumes 1 sweetened beverage(s) daily.She exercises with enough effort to increase her heart rate 10 to 19 minutes per day.  She exercises with enough effort to increase her heart rate 3 or less days per week.   She is taking medications regularly.       Chest Pain  Onset/Duration: 2-3 days   Description:   Location: left side  Character: achey  Radiation: on left side  Duration: 1 minutes   Intensity: moderate  Progression of Symptoms: improving  Accompanying Signs & Symptoms:  Shortness of breath: No  Sweating: No  Nausea/vomiting: No  Lightheadedness: No  Palpitations: No  Fever/Chills: No  Cough: No           Heartburn: No  History:   Family history of heart disease: YES- pt father is currently in hospital for heart failure  Tobacco use: No  Previous similar symptoms: no   Precipitating factors:   Worse with exertion: No  Worse with deep breaths: No           Related to eating: No           Better with burping: No  Alleviating factors: Possibility of anxiety due to father's hospitalization  Therapies tried and outcome: nothing      Last seen patient in April 2024 for annual physical at that time, she is here with acute concerns of chest pain.  Patient declined ER and wanted to be seen by PCP.     No Known Allergies     History reviewed. No pertinent past medical history.    History reviewed. No pertinent surgical history.    History reviewed. No pertinent family history.    Social History     Tobacco Use    Smoking status: Never    Smokeless tobacco: Never   Substance Use Topics    Alcohol use: Never     "    Current Outpatient Medications   Medication Sig Dispense Refill    famotidine (PEPCID) 20 MG tablet Take 1 tablet (20 mg) by mouth 2 times daily 180 tablet 1    ferrous sulfate (FE TABS) 325 (65 Fe) MG EC tablet Take 1 tablet (325 mg) by mouth daily 90 tablet 1    sertraline (ZOLOFT) 25 MG tablet Take 1 tablet (25 mg) by mouth daily 30 tablet 1    tiZANidine (ZANAFLEX) 4 MG capsule Take 1 capsule (4 mg) by mouth nightly as needed for muscle spasms 30 capsule 1    Vitamin D, Cholecalciferol, 10 MCG (400 UNIT) TABS       clobetasol propionate (TEMOVATE) 0.05 % external cream Apply topically 2 times daily (Patient not taking: Reported on 7/19/2024) 60 g 1     No current facility-administered medications for this visit.          Review of Systems  Constitutional, HEENT, cardiovascular, pulmonary, GI, , musculoskeletal, neuro, skin, endocrine and psych systems are negative, except as otherwise noted.      Objective    /81   Pulse 74   Temp 98.5  F (36.9  C) (Temporal)   Resp 16   Ht 1.555 m (5' 1.22\")   Wt 51.1 kg (112 lb 11.2 oz)   LMP 07/06/2024 (Exact Date)   SpO2 100%   BMI 21.14 kg/m    Body mass index is 21.14 kg/m .  Physical Exam   GENERAL: alert and no distress  NECK: no adenopathy, no asymmetry, masses, or scars  RESP: lungs clear to auscultation - no rales, rhonchi or wheezes  CV: regular rate and rhythm, normal S1 S2, no S3 or S4, no murmur, click or rub, no peripheral edema.  Chest pain nonreproducible on palpation of the left lower ribs where she depicts typically her chest pain happens.  ABDOMEN: soft, nontender, no hepatosplenomegaly, no masses and bowel sounds normal  MS: no gross musculoskeletal defects noted, no edema      Signed Electronically by: Raquel Hernandez MD    "

## 2025-05-16 ENCOUNTER — TRANSFERRED RECORDS (OUTPATIENT)
Dept: HEALTH INFORMATION MANAGEMENT | Facility: CLINIC | Age: 40
End: 2025-05-16
Payer: COMMERCIAL

## 2025-05-17 ENCOUNTER — TRANSCRIBE ORDERS (OUTPATIENT)
Dept: OTHER | Age: 40
End: 2025-05-17

## 2025-05-17 ENCOUNTER — HEALTH MAINTENANCE LETTER (OUTPATIENT)
Age: 40
End: 2025-05-17

## 2025-05-17 DIAGNOSIS — H40.1290 NORMAL TENSION GLAUCOMA: Primary | ICD-10-CM

## 2025-05-19 ENCOUNTER — PATIENT OUTREACH (OUTPATIENT)
Dept: CARE COORDINATION | Facility: CLINIC | Age: 40
End: 2025-05-19
Payer: COMMERCIAL

## 2025-05-21 ENCOUNTER — PATIENT OUTREACH (OUTPATIENT)
Dept: CARE COORDINATION | Facility: CLINIC | Age: 40
End: 2025-05-21
Payer: COMMERCIAL

## 2025-07-12 ENCOUNTER — MYC REFILL (OUTPATIENT)
Dept: FAMILY MEDICINE | Facility: CLINIC | Age: 40
End: 2025-07-12
Payer: COMMERCIAL

## 2025-07-12 DIAGNOSIS — L30.9 DERMATITIS: ICD-10-CM

## 2025-07-14 RX ORDER — CLOBETASOL PROPIONATE 0.5 MG/G
CREAM TOPICAL 2 TIMES DAILY
Qty: 60 G | Refills: 0 | Status: SHIPPED | OUTPATIENT
Start: 2025-07-14

## 2025-07-14 NOTE — TELEPHONE ENCOUNTER
Refill done.    Pt to keep up scheduled appt on 8/14/25 for further evaluation    Thank you  Raquel Hernandez MD on 7/14/2025 at 10:55 AM

## 2025-08-26 DIAGNOSIS — H40.1230: Primary | ICD-10-CM

## 2025-08-28 ENCOUNTER — OFFICE VISIT (OUTPATIENT)
Dept: OPHTHALMOLOGY | Facility: CLINIC | Age: 40
End: 2025-08-28
Attending: OPHTHALMOLOGY
Payer: COMMERCIAL

## 2025-08-28 DIAGNOSIS — H40.1290 NORMAL TENSION GLAUCOMA: ICD-10-CM

## 2025-08-28 DIAGNOSIS — H40.1230 LOW-TENSION GLAUCOMA OF BOTH EYES, UNSPECIFIED GLAUCOMA STAGE: Primary | ICD-10-CM

## 2025-08-28 ASSESSMENT — CONF VISUAL FIELD
OD_INFERIOR_NASAL_RESTRICTION: 0
OS_INFERIOR_NASAL_RESTRICTION: 0
OD_SUPERIOR_TEMPORAL_RESTRICTION: 0
OD_INFERIOR_TEMPORAL_RESTRICTION: 0
OD_NORMAL: 1
OS_INFERIOR_TEMPORAL_RESTRICTION: 0
METHOD: COUNTING FINGERS
OS_NORMAL: 1
OD_SUPERIOR_NASAL_RESTRICTION: 0
OS_SUPERIOR_TEMPORAL_RESTRICTION: 0
OS_SUPERIOR_NASAL_RESTRICTION: 0

## 2025-08-28 ASSESSMENT — PACHYMETRY
OD_CT(UM): 492
OS_CT(UM): 493

## 2025-08-28 ASSESSMENT — VISUAL ACUITY
OS_SC: 20/20
OS_SC+: -1
METHOD: SNELLEN - LINEAR
OD_SC+: -2
OD_SC: 20/20

## 2025-08-28 ASSESSMENT — TONOMETRY
OS_IOP_MMHG: 12
OD_IOP_MMHG: 14
IOP_METHOD: ICARE

## 2025-08-30 ENCOUNTER — HEALTH MAINTENANCE LETTER (OUTPATIENT)
Age: 40
End: 2025-08-30